# Patient Record
Sex: FEMALE | Race: WHITE | Employment: FULL TIME | URBAN - METROPOLITAN AREA
[De-identification: names, ages, dates, MRNs, and addresses within clinical notes are randomized per-mention and may not be internally consistent; named-entity substitution may affect disease eponyms.]

---

## 2023-06-26 LAB
ALBUMIN SERPL-MCNC: 4.2 G/DL
ALP BLD-CCNC: 44 U/L
ALT SERPL-CCNC: 25 U/L
ANION GAP SERPL CALCULATED.3IONS-SCNC: NORMAL MMOL/L
AST SERPL-CCNC: 31 U/L
BILIRUB SERPL-MCNC: NORMAL MG/DL
BUN BLDV-MCNC: NORMAL MG/DL
CALCIUM SERPL-MCNC: 9.7 MG/DL
CHLORIDE BLD-SCNC: 104 MMOL/L
CHOLESTEROL, TOTAL: 173 MG/DL
CHOLESTEROL/HDL RATIO: NORMAL
CO2: 24 MMOL/L
CREAT SERPL-MCNC: 0.81 MG/DL
EGFR: 85
GLUCOSE BLD-MCNC: 92 MG/DL
HDLC SERPL-MCNC: 55 MG/DL (ref 35–70)
LDL CHOLESTEROL CALCULATED: 105 MG/DL (ref 0–160)
NONHDLC SERPL-MCNC: 118 MG/DL
POTASSIUM SERPL-SCNC: 3.9 MMOL/L
SODIUM BLD-SCNC: 138 MMOL/L
TOTAL PROTEIN: 7.6
TRIGL SERPL-MCNC: 71 MG/DL
VLDLC SERPL CALC-MCNC: NORMAL MG/DL

## 2023-06-27 LAB
AVERAGE GLUCOSE: 114
HBA1C MFR BLD: 5.6 %

## 2023-08-22 ENCOUNTER — HOSPITAL ENCOUNTER (OUTPATIENT)
Dept: PHYSICAL THERAPY | Age: 58
Setting detail: THERAPIES SERIES
Discharge: HOME | End: 2023-08-22
Payer: COMMERCIAL

## 2023-08-22 PROCEDURE — 97110 THERAPEUTIC EXERCISES: CPT

## 2023-08-22 PROCEDURE — 97161 PT EVAL LOW COMPLEX 20 MIN: CPT

## 2023-08-22 NOTE — PLAN OF CARE
with minimal to no noted difficulty. 4). Pt able to ascend and descend stairs with minimal to no noted difficulty. 5). 5). Pt able to amb 120 feet with minimal to noted difficulty    6). 6). Pt to increase LEFI to 65/80     PLAN OF CARE     To see patient  2 x/week for  6 weeks for the following treatment interventions:    [x]   Therapeutic Exercise  []   Modalities of Choice:   []   Vasopneumatic pump    [] Lymphatouch     [x]   Manual Therapy  [x]   Neuromuscular Re-Education  [x]   Gait Training   []   Therapeutic Activity  [x]   Lymphedema precautions and prevention  [x]   Use of compression garment as appropriate  []   Other     Treatment Performed this visit :   20 minutes   Education on lymphedema versus lipedema and lymphatic system   Exercise: toe curls, ankle pumps, knee bends and hip ER/IR    Pt response to Tx:  Pt verbalized  understanding and demonstrated appropriate techniques with exercise. Plan for Next Visit:   MLD to bilateral LE   kinesiotaping    Timed Code Treatment Minutes:   20  minutes   Total Treatment Time:  40  minutes    Plan of care sent to provider:      [x]Faxed  []Co-signature    (attempts: 1[x] 2 []3[])         Medicare Cap total YTD:    [x]N/A  Workers Comp Time Stamp  [x]N/A   Time In:   Time Out:    Thank you for the referral of this patient.       Va Caraballo PT [facility]  License #[id number]

## 2023-08-22 NOTE — FLOWSHEET NOTE
reducing/eliminating soft tissue swelling/inflammation/restriction, improving soft tissue extensibility and allowing for proper ROM for normal function with self care, mobility, lifting and ambulation. []CRP:  Canalith Repositioning procedure for the assessment, treatment and education of BPPV    Modalities:       Charges:  Timed Code Treatment Minutes: 20   Total Treatment Minutes: 40     Medicare Cap total YTD:        [x]N/A  Workers Comp Time Stamp  (Per CPT and Total Treatment) [x]N/A   Time In:   Time Out:       [x] EVAL    [] Dry Needling  [x] OH(54077)   x  1   [] EStim Unattended 73547  [] NMR (70348)  x     [] Estim Attended  17487  [] Manual (29644)  x      [] Mechanical Txn 02419  [] TA    x     [] Ultrasound  [] Gait   x  [] Vaso  [] CRP    [] Ionto           [] Other:        Electronically signed by: BRANT Fournier,[id number]      Note: If patient does not return for scheduled/ recommended follow up visits, this note will serve as a discharge from care along with most recent update on progress.

## 2023-09-05 ENCOUNTER — HOSPITAL ENCOUNTER (OUTPATIENT)
Dept: PHYSICAL THERAPY | Age: 58
Setting detail: THERAPIES SERIES
Discharge: HOME | End: 2023-09-05
Payer: COMMERCIAL

## 2023-09-05 PROCEDURE — 97110 THERAPEUTIC EXERCISES: CPT

## 2023-09-05 PROCEDURE — 97140 MANUAL THERAPY 1/> REGIONS: CPT

## 2023-09-05 NOTE — FLOWSHEET NOTE
proximal hip and LS spine soft tissue/joints for the purpose of modulating pain, promoting relaxation,  increasing ROM, reducing/eliminating soft tissue swelling/inflammation/restriction, improving soft tissue extensibility and allowing for proper ROM for normal function with self care, mobility, lifting and ambulation. []CRP:  Canalith Repositioning procedure for the assessment, treatment and education of BPPV    Modalities:       Charges:  Timed Code Treatment Minutes: 90   Total Treatment Minutes: 90     Medicare Cap total YTD:        [x]N/A  Workers Comp Time Stamp  (Per CPT and Total Treatment) [x]N/A   Time In:   Time Out:       [] EVAL    [] Dry Needling  [x] DP(40924)   x  1   [] EStim Unattended 85150  [] NMR (22721)  x     [] Estim Attended  49776  [x] Manual (40644)  x 5     [] Mechanical Txn 71248  [] TA    x     [] Ultrasound  [] Gait   x  [] Vaso  [] CRP    [] Ionto           [] Other:        Electronically signed by: NICK Uribe,[id number]      Note: If patient does not return for scheduled/ recommended follow up visits, this note will serve as a discharge from care along with most recent update on progress.

## 2023-09-12 ENCOUNTER — HOSPITAL ENCOUNTER (OUTPATIENT)
Dept: PHYSICAL THERAPY | Age: 58
Setting detail: THERAPIES SERIES
Discharge: HOME | End: 2023-09-12
Payer: COMMERCIAL

## 2023-09-12 NOTE — FLOWSHEET NOTE
Physical Therapy  Cancellation/No-show Note  Patient Name:  Jc Bueno  :  1965   Date:  2023  Cancels to Date: 1  No-shows to Date: 0    For today's appointment patient:  [x]  Cancelled  []  Rescheduled appointment  []  No-show     Reason given by patient:  []  Patient ill  [x]  Conflicting appointment  []  No transportation    []  Conflict with work  []  No reason given  []  Other:     Comments:      Electronically signed by:   María Elena Castro, [address] [place] [address], [id number]

## 2023-09-14 ENCOUNTER — HOSPITAL ENCOUNTER (OUTPATIENT)
Dept: PHYSICAL THERAPY | Age: 58
Setting detail: THERAPIES SERIES
Discharge: HOME | End: 2023-09-14
Payer: COMMERCIAL

## 2023-09-14 PROCEDURE — 97140 MANUAL THERAPY 1/> REGIONS: CPT

## 2023-09-14 NOTE — FLOWSHEET NOTE
Physical Therapy Daily Treatment Note    [x]Daily Tx Note    []Progress Note    [] Discharge Summary         Date:  2023    Patient Name:  Yana Davis    :  1965  MRN: [id number]      Medical/Treatment Diagnosis Information:  Diagnosis: chronic lymphedema bilateral I89.0  Treatment Diagnosis: pain and abnormality of gait    Insurance/Certification information:  PT Insurance Information: [facility] no authorization required    Physician Information:  Referring Provider (secondary): Petrona Melo MD      Plan of care sent to provider:      [x]Faxed   []Co-signature    (attempts: 1[x] 23 2 []3[])     Plan of care signed :  [x]  Yes  [] No      Date of Patient follow up with Physician:     Is this a Progress Report:     []  Yes  [x]  No      If Yes:  Date Range for reporting period:  Beginning 2023  Ending    Progress report will be due (10 Rx or 30 days whichever is less):   NV    Recertification will be due (POC Duration  / 90 days whichever is less): 2023      Visit # Insurance Allowable Auth Required   3/12   []  Yes [x]  No        Functional Scale:  LEFI   Date    2023  Score   55/80    Latex Allergy:  [x]NO      []YES  Preferred Language for Healthcare:   [x]English       []other:    RESTRICTIONS/PRECAUTIONS:      SUBJECTIVE: Pt reports that she is doing better overall and went to surgical specialist for lipedema and will be planning surgery. Pt will have 5 surgeries 6 weeks apart. Pain level:  bilateral knee pain 3/10  Plan Moving Forward/ For next visit:   MLD to bilateral LE   kinesiotaping      OBJECTIVE: See eval  O2 sat 94%, pulse 70, /83 Beginning     Exercises/Interventions:     Exercises in bold performed in department today. Items not bolded are carried forward from prior visits for continuity of the record.   Exercise/Activity Sets/ Reps/ Resistance    Comments/ Details HEP     Scifit  5 minutes seat 18 arms 7     Therapeutic Ex        toe

## 2023-09-26 ENCOUNTER — HOSPITAL ENCOUNTER (OUTPATIENT)
Dept: PHYSICAL THERAPY | Age: 58
Setting detail: THERAPIES SERIES
Discharge: HOME | End: 2023-09-26
Payer: COMMERCIAL

## 2023-09-26 PROCEDURE — 97110 THERAPEUTIC EXERCISES: CPT

## 2023-09-26 PROCEDURE — 97140 MANUAL THERAPY 1/> REGIONS: CPT

## 2023-09-26 NOTE — PROGRESS NOTES
Physical Therapy Daily Treatment Note    [x]Daily Tx Note    [x]Progress Note    [] Discharge Summary         Date:  2023    Patient Name:  Angel Luis Mcdowell    :  1965  MRN: [id number]      Medical/Treatment Diagnosis Information:  Diagnosis: chronic lymphedema bilateral I89.0  Treatment Diagnosis: pain and abnormality of gait    Insurance/Certification information:  PT Insurance Information: [facility] no authorization required    Physician Information:  Referring Provider (secondary): Jimmy Saldaña MD      Plan of care sent to provider:      [x]Faxed   []Co-signature    (attempts: 1[x] 23 2 []3[])     Plan of care signed :  [x]  Yes  [] No      Date of Patient follow up with Physician:     Is this a Progress Report:     []  Yes  [x]  No      If Yes:  Date Range for reporting period:  Beginning 2023  Ending    Progress report will be due (10 Rx or 30 days whichever is less):     Recertification will be due (POC Duration  / 90 days whichever is less): 2023      Visit # Insurance Allowable Auth Required      []  Yes [x]  No        Functional Scale:  LEFI   Date    2023  Score   55/80    Latex Allergy:  [x]NO      []YES  Preferred Language for Healthcare:   [x]English       []other:    RESTRICTIONS/PRECAUTIONS:      SUBJECTIVE: Pt stated that massage and wrapping has helped but had noted irritation with removal of tape.       Pain level:  0/10 beginning and end of treatment    Plan Moving Forward/ For next visit:   MLD to bilateral LE   kinesiotaping      OBJECTIVE: See eval  O2 sat 96%, pulse 62, /84 Beginning   Lower Extremity R L R L   Date           10 Inches above knee distal patella 70.5 66.4 65.7 63.9   6 Inches above knee distal patella         59.5 56.8 56.2 56.9   Knee distal patella          43.5 40.7 43.2 41.5   5 Below knee distal patella 51.1 46.4 48.5 46.4   10 Below knee distal patella 35.5 35.4 34.9 35.4           Proximal

## 2023-09-28 ENCOUNTER — APPOINTMENT (OUTPATIENT)
Dept: PHYSICAL THERAPY | Age: 58
End: 2023-09-28
Payer: COMMERCIAL

## 2023-10-12 ENCOUNTER — APPOINTMENT (OUTPATIENT)
Dept: PHYSICAL THERAPY | Age: 58
End: 2023-10-12
Payer: COMMERCIAL

## 2023-10-17 ENCOUNTER — HOSPITAL ENCOUNTER (OUTPATIENT)
Dept: PHYSICAL THERAPY | Age: 58
Setting detail: THERAPIES SERIES
Discharge: HOME | End: 2023-10-17
Payer: COMMERCIAL

## 2023-10-17 PROCEDURE — 97110 THERAPEUTIC EXERCISES: CPT

## 2023-10-17 PROCEDURE — 97140 MANUAL THERAPY 1/> REGIONS: CPT

## 2023-10-17 NOTE — PROGRESS NOTES
Physical Therapy Daily Treatment Note    [x]Daily Tx Note    []Progress Note    [] Discharge Summary         Date:  10/17/2023    Patient Name:  Betsey Pagan    :  1965  MRN: [id number]      Medical/Treatment Diagnosis Information:  Diagnosis: chronic lymphedema bilateral I89.0  Treatment Diagnosis: pain and abnormality of gait    Insurance/Certification information:  PT Insurance Information: [facility] no authorization required    Physician Information:  Referring Provider (secondary): Dmitry Angelo MD      Plan of care sent to provider:      [x]Faxed   []Co-signature    (attempts: 1[x] 23 2 []3[])     Plan of care signed :  [x]  Yes  [] No      Date of Patient follow up with Physician:     Is this a Progress Report:     []  Yes  [x]  No      If Yes:  Date Range for reporting period:  Beginning 2023  Ending    Progress report will be due (10 Rx or 30 days whichever is less):    NV  Recertification will be due (POC Duration  / 90 days whichever is less): 2023      Visit # Insurance Allowable Auth Required      []  Yes [x]  No        Functional Scale:  LEFI   Date    2023  Score   55/80    Latex Allergy:  [x]NO      []YES  Preferred Language for Healthcare:   [x]English       []other:    RESTRICTIONS/PRECAUTIONS:      SUBJECTIVE: Pt stated that she has been sore in bilateral hips secondary to walking 15,000 steps per day when on vacation. Pt reports that pain was 8/10. Pain level:  3/10 beginning and 1-2/10 end of treatment    Plan Moving Forward/ For next visit:   MLD to bilateral LE   kinesiotaping      OBJECTIVE: See eval  O2 sat 92-96% required diaphragmatic breathing, pulse 82, /87 Beginning     Exercises/Interventions:     Exercises in bold performed in department today. Items not bolded are carried forward from prior visits for continuity of the record.   Exercise/Activity Sets/ Reps/ Resistance    Comments/ Details HEP     Scifit  5 minutes seat 18

## 2023-10-19 ENCOUNTER — HOSPITAL ENCOUNTER (OUTPATIENT)
Dept: PHYSICAL THERAPY | Age: 58
Setting detail: THERAPIES SERIES
Discharge: HOME | End: 2023-10-19
Payer: COMMERCIAL

## 2023-10-19 PROCEDURE — 97140 MANUAL THERAPY 1/> REGIONS: CPT

## 2023-10-19 PROCEDURE — 97110 THERAPEUTIC EXERCISES: CPT

## 2023-10-19 NOTE — PROGRESS NOTES
Physical Therapy Daily Treatment Note    []Daily Tx Note    [x]Progress Note    [] Discharge Summary         Date:  10/19/2023    Patient Name:  John Brown    :  1965  MRN: [id number]      Medical/Treatment Diagnosis Information:  Diagnosis: chronic lymphedema bilateral I89.0  Treatment Diagnosis: pain and abnormality of gait    Insurance/Certification information:  PT Insurance Information: [facility] no authorization required    Physician Information:  Referring Provider (secondary): Miladys Jimenez MD      Plan of care sent to provider:      [x]Faxed   []Co-signature    (attempts: 1[x] 23 2 []3[])     Plan of care signed :  [x]  Yes  [] No      Date of Patient follow up with Physician:     Is this a Progress Report:     []  Yes  [x]  No      If Yes:  Date Range for reporting period:  Beginning 2023  Ending 10/19/2023    Progress report will be due (10 Rx or 30 days whichever is less):   1553  Recertification will be due (POC Duration  / 90 days whichever is less): 2023      Visit # Insurance Allowable Auth Required      []  Yes [x]  No        Functional Scale:  LEFI   Date    10/19/2023  Score   61/80    Latex Allergy:  [x]NO      []YES  Preferred Language for Healthcare:   [x]English       []other:    RESTRICTIONS/PRECAUTIONS:      SUBJECTIVE: Pt stated that she has less increased intensity moments in the hips. Pt did not bring wraps today.       Pain level:  2/10 beginning at bilateral hips     Plan Moving Forward/ For next visit:   MLD to bilateral LE   Wrapping   Exercise      OBJECTIVE: See eval  O2 sat 95% required diaphragmatic breathing, pulse 82, /86 Beginning     Lower Extremity R L R L R L   Date 8/22 8/22 9/26 9/26 10/19 10/19          10 Inches above knee distal patella 70.5 66.4 65.7 63.9 66.3 64.9   6 Inches above knee distal patella         59.5 56.8 56.2 56.9 58.8 56.4   Knee distal patella          43.5 40.7 43.2 41.5 42.6 40.2   5 Below knee distal

## 2023-10-24 ENCOUNTER — HOSPITAL ENCOUNTER (OUTPATIENT)
Dept: PHYSICAL THERAPY | Age: 58
Setting detail: THERAPIES SERIES
Discharge: HOME | End: 2023-10-24
Payer: COMMERCIAL

## 2023-10-24 PROCEDURE — 97140 MANUAL THERAPY 1/> REGIONS: CPT

## 2023-10-26 ENCOUNTER — HOSPITAL ENCOUNTER (OUTPATIENT)
Dept: PHYSICAL THERAPY | Age: 58
Setting detail: THERAPIES SERIES
Discharge: HOME | End: 2023-10-26
Payer: COMMERCIAL

## 2023-10-26 PROCEDURE — 97110 THERAPEUTIC EXERCISES: CPT

## 2023-10-26 PROCEDURE — 97140 MANUAL THERAPY 1/> REGIONS: CPT

## 2023-10-26 NOTE — FLOWSHEET NOTE
Poor    Patient requires continued skilled intervention: [x] Yes  [] No    Treatment/Activity Tolerance:  [x] Patient able to complete treatment  [] Patient limited by fatigue  [] Patient limited by pain    [] Patient limited by other medical complications  [] Other:       PLAN: See eval  [x] Continue per plan of care [] Alter current plan (see comments above)  [] Plan of care initiated [] Hold pending MD visit [] Discharge    Therapeutic Exercise and NMR EXR  [x] (62857) Provided verbal/tactile cueing for activities related to strengthening, flexibility, endurance, ROM  for improvements in proximal strength and core control with self care, mobility, lifting and ambulation.  [] (39932) Provided verbal/tactile cueing for activities related to improving balance, coordination, kinesthetic sense, posture, motor skill, proprioception  to assist with core control in self care, mobility, lifting, and ambulation.      Therapeutic Activities and Gait:    [] (10017 or 90583) Provided verbal/tactile cueing for activities related to improving balance, coordination, kinesthetic sense, posture, motor skill, proprioception and motor activation to allow for proper function  with self care and ADLs  [] (77509) Provided training and instruction to the patient for proper core and proximal hip recruitment and positioning with ambulation re-education     Home Exercise Program:    [x] (00216) Reviewed/Progressed HEP activities related to strengthening, flexibility, endurance, ROM of core, proximal hip and LE for functional self-care, mobility, lifting and ambulation   [] (70003) Reviewed/Progressed HEP activities related to improving balance, coordination, kinesthetic sense, posture, motor skill, proprioception of core, proximal hip and LE for self care, mobility, lifting, and ambulation      Manual Treatments:  PROM / STM / Oscillations-Mobs:  G-I, II, III, IV (PA's, Inf., Post.)  [x] (23666) Provided manual therapy to mobilize proximal

## 2023-10-31 ENCOUNTER — HOSPITAL ENCOUNTER (OUTPATIENT)
Dept: PHYSICAL THERAPY | Age: 58
Setting detail: THERAPIES SERIES
Discharge: HOME | End: 2023-10-31
Payer: COMMERCIAL

## 2023-11-02 ENCOUNTER — HOSPITAL ENCOUNTER (OUTPATIENT)
Dept: PHYSICAL THERAPY | Age: 58
Setting detail: THERAPIES SERIES
Discharge: HOME | End: 2023-11-02
Payer: COMMERCIAL

## 2023-11-02 PROCEDURE — 97140 MANUAL THERAPY 1/> REGIONS: CPT

## 2023-11-02 PROCEDURE — 97110 THERAPEUTIC EXERCISES: CPT

## 2023-11-02 NOTE — FLOWSHEET NOTE
motor skill, proprioception of core, proximal hip and LE for self care, mobility, lifting, and ambulation      Manual Treatments:  PROM / STM / Oscillations-Mobs:  G-I, II, III, IV (PA's, Inf., Post.)  [x] (74296) Provided manual therapy to mobilize proximal hip and LS spine soft tissue/joints for the purpose of modulating pain, promoting relaxation,  increasing ROM, reducing/eliminating soft tissue swelling/inflammation/restriction, improving soft tissue extensibility and allowing for proper ROM for normal function with self care, mobility, lifting and ambulation. []CRP:  Canalith Repositioning procedure for the assessment, treatment and education of BPPV    Modalities:       Charges:  Timed Code Treatment Minutes: 83   Total Treatment Minutes: 83     Medicare Cap total YTD:        [x]N/A  Workers Comp Time Stamp  (Per CPT and Total Treatment) [x]N/A   Time In:   Time Out:       [] EVAL    [] Dry Needling  [x] SO(40327)   x 1    [] EStim Unattended 46688  [] NMR (03253)  x     [] Estim Attended  03594  [x] Manual (34805)  x 5     [] Mechanical Txn 44742  [] TA    x     [] Ultrasound  [] Gait   x  [] Vaso  [] CRP    [] Ionto           [] Other:        Electronically signed by: KIMBERLY Garvey,[id number]      Note: If patient does not return for scheduled/ recommended follow up visits, this note will serve as a discharge from care along with most recent update on progress.

## 2023-11-03 ENCOUNTER — APPOINTMENT (OUTPATIENT)
Dept: PHYSICAL THERAPY | Age: 58
End: 2023-11-03
Payer: COMMERCIAL

## 2023-11-07 ENCOUNTER — HOSPITAL ENCOUNTER (OUTPATIENT)
Dept: PHYSICAL THERAPY | Age: 58
Setting detail: THERAPIES SERIES
Discharge: HOME | End: 2023-11-07
Payer: COMMERCIAL

## 2023-11-07 PROCEDURE — 97140 MANUAL THERAPY 1/> REGIONS: CPT

## 2023-11-07 PROCEDURE — 97110 THERAPEUTIC EXERCISES: CPT

## 2023-11-07 NOTE — FLOWSHEET NOTE
goals listed. [] Progression is slowed due to complexities/Impairments listed. [] Progression has been slowed due to co-morbidities. [] Plan just implemented, too soon to assess goals progression <30days   [] Goals require adjustment due to lack of progress  [] Patient is not progressing as expected and requires additional follow up with physician  [] Other    Prognosis for POC: [x] Good [] Fair  [] Poor    Patient requires continued skilled intervention: [x] Yes  [] No    Treatment/Activity Tolerance:  [x] Patient able to complete treatment  [] Patient limited by fatigue  [] Patient limited by pain    [] Patient limited by other medical complications  [] Other:       PLAN: See eval  [x] Continue per plan of care [] Alter current plan (see comments above)  [] Plan of care initiated [] Hold pending MD visit [] Discharge    Therapeutic Exercise and NMR EXR  [x] (62134) Provided verbal/tactile cueing for activities related to strengthening, flexibility, endurance, ROM  for improvements in proximal strength and core control with self care, mobility, lifting and ambulation.  [] (05983) Provided verbal/tactile cueing for activities related to improving balance, coordination, kinesthetic sense, posture, motor skill, proprioception  to assist with core control in self care, mobility, lifting, and ambulation.      Therapeutic Activities and Gait:    [] (41470 or 78422) Provided verbal/tactile cueing for activities related to improving balance, coordination, kinesthetic sense, posture, motor skill, proprioception and motor activation to allow for proper function  with self care and ADLs  [] (99306) Provided training and instruction to the patient for proper core and proximal hip recruitment and positioning with ambulation re-education     Home Exercise Program:    [x] (96065) Reviewed/Progressed HEP activities related to strengthening, flexibility, endurance, ROM of core, proximal hip and LE for functional self-care,

## 2023-11-09 ENCOUNTER — HOSPITAL ENCOUNTER (OUTPATIENT)
Dept: PHYSICAL THERAPY | Age: 58
Setting detail: THERAPIES SERIES
Discharge: HOME | End: 2023-11-09
Payer: COMMERCIAL

## 2023-11-09 PROCEDURE — 97110 THERAPEUTIC EXERCISES: CPT

## 2023-11-09 PROCEDURE — 97140 MANUAL THERAPY 1/> REGIONS: CPT

## 2023-11-09 NOTE — FLOWSHEET NOTE
Physical Therapy Daily Treatment Note    []Daily Tx Note    []Progress Note    [] Discharge Summary         Date:  2023    Patient Name:  Troy Parmar    :  1965  MRN: [id number]      Medical/Treatment Diagnosis Information:  Diagnosis: chronic lymphedema bilateral I89.0  Treatment Diagnosis: pain and abnormality of gait    Insurance/Certification information:  PT Insurance Information: [facility] no authorization required; [place]    Physician Information:  Referring Provider (secondary): Kassandra Crain MD      Plan of care sent to provider:      [x]Faxed   []Co-signature    (attempts: 1[x] 23 2 []3[])     Plan of care signed :  [x]  Yes  [] No      Date of Patient follow up with Physician:     Is this a Progress Report:     []  Yes  [x]  No      If Yes:  Date Range for reporting period:  Beginning 2023  Ending 10/19/2023    Progress report will be due (10 Rx or 30 days whichever is less):    NV  Recertification will be due (POC Duration  / 90 days whichever is less): 2023      Visit # Insurance Allowable Auth Required      []  Yes [x]  No        Functional Scale:  LEFI   Date    10/19/2023  Score   61/80    Latex Allergy:  [x]NO      []YES  Preferred Language for Healthcare:   [x]English       []other:    RESTRICTIONS/PRECAUTIONS:      SUBJECTIVE: Pt reports that she is doing ok today. Pain level:  0.5-1/10 beginning at bilateral hips    Plan Moving Forward/ For next visit:   MLD to bilateral LE   Wrapping   Exercise      OBJECTIVE:   O2 sat 95%, pulse 88, /79 Beginning manual      Exercises/Interventions:     Exercises in bold performed in department today. Items not bolded are carried forward from prior visits for continuity of the record.   Exercise/Activity Sets/ Reps/ Resistance    Comments/ Details HEP     Scifit  5 minutes seat 18 arms 7 subjective    Therapeutic Ex        toe curls, ankle pumps, knee bends and hip ER/IR  1x10       Quad sets and glut

## 2023-11-14 ENCOUNTER — HOSPITAL ENCOUNTER (OUTPATIENT)
Dept: PHYSICAL THERAPY | Age: 58
Setting detail: THERAPIES SERIES
Discharge: HOME | End: 2023-11-14
Payer: COMMERCIAL

## 2023-11-14 NOTE — FLOWSHEET NOTE
Physical Therapy  Cancellation/No-show Note  Patient Name:  Misty Sheth  :  1965   Date:  2023  Cancels to Date: 3  No-shows to Date: 0    For today's appointment patient:  [x]  Cancelled  []  Rescheduled appointment  []  No-show     Reason given by patient:  []  Patient ill  []  Conflicting appointment  []  No transportation    [x]  Conflict with work  []  No reason given  []  Other:     Comments:      Electronically signed by:   Danielle Allred, [id number]

## 2023-11-15 ENCOUNTER — TELEPHONE (OUTPATIENT)
Dept: FAMILY MEDICINE CLINIC | Age: 58
End: 2023-11-15

## 2023-11-15 NOTE — TELEPHONE ENCOUNTER
----- Message from [facility] sent at 11/15/2023  9:14 AM EST -----  Subject: Appointment Request    Reason for Call: New Patient/New to Provider Appointment needed: New   Patient Request Appointment    QUESTIONS    Reason for appointment request? Requested Provider unavailable - Allison Padilla PA-C     Additional Information for Provider? New patient would like to get   established care with the provider requested and if she isn't available,   the pt would like to establish care with anyone else in the practice. No   appointments were available please give pt a call back to assist further.    Patient stated she is pretty flexible for scheduling and would prefer a   morning appt on any day.   ---------------------------------------------------------------------------  --------------  August [facility]  [phone]; OK to leave message on voicemail  ---------------------------------------------------------------------------  --------------  SCRIPT ANSWERS

## 2023-11-16 ENCOUNTER — APPOINTMENT (OUTPATIENT)
Dept: PHYSICAL THERAPY | Age: 58
End: 2023-11-16
Payer: COMMERCIAL

## 2023-11-16 ENCOUNTER — HOSPITAL ENCOUNTER (OUTPATIENT)
Dept: PHYSICAL THERAPY | Age: 58
Setting detail: THERAPIES SERIES
Discharge: HOME | End: 2023-11-16
Payer: COMMERCIAL

## 2023-11-16 PROCEDURE — 97140 MANUAL THERAPY 1/> REGIONS: CPT

## 2023-11-16 NOTE — FLOWSHEET NOTE
Dr. Alice Aase,   Pt has been seen for a total of 12 visits recommend to continue 2 times per week for 4 more weeks to progress exercise and perform manual treatment. Pt continues to show decrease in overall girth measurements. Recommendations:_________________________________________________________________      Signature:_________________________________________________________________________    Physical Therapy Daily Treatment Note    []Daily Tx Note    []Progress Note    [] Discharge Summary         Date:  2023    Patient Name:  Cem Josue    :  1965  MRN: [id number]      Medical/Treatment Diagnosis Information:  Diagnosis: chronic lymphedema bilateral I89.0  Treatment Diagnosis: pain and abnormality of gait    Insurance/Certification information:  PT Insurance Information: [facility] no authorization required; [place]    Physician Information:  Referring Provider (secondary): Modesta Dean MD      Plan of care sent to provider:      [x]Faxed   []Co-signature    (attempts: 1[x] 23 2 []3[])     Plan of care signed :  [x]  Yes  [] No      Date of Patient follow up with Physician:     Is this a Progress Report:     [x]  Yes  []  No      If Yes:  Date Range for reporting period:  Beginning 10/19/2023  Ending 2023    Progress report will be due (10 Rx or 30 days whichever is less):     Recertification will be due (POC Duration  / 90 days whichever is less): 2023      Visit # Insurance Allowable Auth Required      []  Yes [x]  No        Functional Scale:  LEFI   Date    10/19/2023  Score   61/80    Latex Allergy:  [x]NO      []YES  Preferred Language for Healthcare:   [x]English       []other:    RESTRICTIONS/PRECAUTIONS:      SUBJECTIVE: Pt reports that she is did not sleep well secondary to the cat.       Pain level: 2/10 beginning all overall body; end 1/10    Plan Moving Forward/ For next visit:   MLD to bilateral LE   Wrapping   Exercise      OBJECTIVE:   O2 sat 94-97%

## 2023-11-20 SDOH — HEALTH STABILITY: PHYSICAL HEALTH: ON AVERAGE, HOW MANY MINUTES DO YOU ENGAGE IN EXERCISE AT THIS LEVEL?: 30 MIN

## 2023-11-20 SDOH — HEALTH STABILITY: PHYSICAL HEALTH: ON AVERAGE, HOW MANY DAYS PER WEEK DO YOU ENGAGE IN MODERATE TO STRENUOUS EXERCISE (LIKE A BRISK WALK)?: 4 DAYS

## 2023-11-21 ENCOUNTER — HOSPITAL ENCOUNTER (OUTPATIENT)
Dept: PHYSICAL THERAPY | Age: 58
Setting detail: THERAPIES SERIES
Discharge: HOME | End: 2023-11-21
Payer: COMMERCIAL

## 2023-11-21 ENCOUNTER — OFFICE VISIT (OUTPATIENT)
Dept: FAMILY MEDICINE CLINIC | Age: 58
End: 2023-11-21
Payer: COMMERCIAL

## 2023-11-21 VITALS
BODY MASS INDEX: 41.42 KG/M2 | HEIGHT: 64 IN | OXYGEN SATURATION: 97 % | WEIGHT: 242.6 LBS | HEART RATE: 77 BPM | DIASTOLIC BLOOD PRESSURE: 84 MMHG | SYSTOLIC BLOOD PRESSURE: 112 MMHG | RESPIRATION RATE: 16 BRPM

## 2023-11-21 DIAGNOSIS — E66.01 CLASS 3 SEVERE OBESITY WITHOUT SERIOUS COMORBIDITY WITH BODY MASS INDEX (BMI) OF 40.0 TO 44.9 IN ADULT, UNSPECIFIED OBESITY TYPE (HCC): Primary | ICD-10-CM

## 2023-11-21 DIAGNOSIS — Z12.11 COLON CANCER SCREENING: ICD-10-CM

## 2023-11-21 PROBLEM — R60.9 LIPEDEMA: Status: ACTIVE | Noted: 2023-11-21

## 2023-11-21 PROCEDURE — 99203 OFFICE O/P NEW LOW 30 MIN: CPT | Performed by: PHYSICIAN ASSISTANT

## 2023-11-21 PROCEDURE — 3017F COLORECTAL CA SCREEN DOC REV: CPT | Performed by: PHYSICIAN ASSISTANT

## 2023-11-21 PROCEDURE — G8427 DOCREV CUR MEDS BY ELIG CLIN: HCPCS | Performed by: PHYSICIAN ASSISTANT

## 2023-11-21 PROCEDURE — G8484 FLU IMMUNIZE NO ADMIN: HCPCS | Performed by: PHYSICIAN ASSISTANT

## 2023-11-21 PROCEDURE — 97140 MANUAL THERAPY 1/> REGIONS: CPT

## 2023-11-21 PROCEDURE — 97110 THERAPEUTIC EXERCISES: CPT

## 2023-11-21 PROCEDURE — 1036F TOBACCO NON-USER: CPT | Performed by: PHYSICIAN ASSISTANT

## 2023-11-21 PROCEDURE — G8417 CALC BMI ABV UP PARAM F/U: HCPCS | Performed by: PHYSICIAN ASSISTANT

## 2023-11-21 RX ORDER — CALCIUM CARBONATE/VITAMIN D3 600MG-62.5
1 CAPSULE ORAL DAILY
COMMUNITY

## 2023-11-21 RX ORDER — BIOTIN 5 MG
TABLET ORAL
COMMUNITY

## 2023-11-21 SDOH — ECONOMIC STABILITY: HOUSING INSECURITY
IN THE LAST 12 MONTHS, WAS THERE A TIME WHEN YOU DID NOT HAVE A STEADY PLACE TO SLEEP OR SLEPT IN A SHELTER (INCLUDING NOW)?: NO

## 2023-11-21 SDOH — ECONOMIC STABILITY: FOOD INSECURITY: WITHIN THE PAST 12 MONTHS, THE FOOD YOU BOUGHT JUST DIDN'T LAST AND YOU DIDN'T HAVE MONEY TO GET MORE.: NEVER TRUE

## 2023-11-21 SDOH — ECONOMIC STABILITY: FOOD INSECURITY: WITHIN THE PAST 12 MONTHS, YOU WORRIED THAT YOUR FOOD WOULD RUN OUT BEFORE YOU GOT MONEY TO BUY MORE.: NEVER TRUE

## 2023-11-21 SDOH — ECONOMIC STABILITY: INCOME INSECURITY: HOW HARD IS IT FOR YOU TO PAY FOR THE VERY BASICS LIKE FOOD, HOUSING, MEDICAL CARE, AND HEATING?: NOT HARD AT ALL

## 2023-11-21 ASSESSMENT — PATIENT HEALTH QUESTIONNAIRE - PHQ9
4. FEELING TIRED OR HAVING LITTLE ENERGY: 0
8. MOVING OR SPEAKING SO SLOWLY THAT OTHER PEOPLE COULD HAVE NOTICED. OR THE OPPOSITE, BEING SO FIGETY OR RESTLESS THAT YOU HAVE BEEN MOVING AROUND A LOT MORE THAN USUAL: 1
2. FEELING DOWN, DEPRESSED OR HOPELESS: 0
SUM OF ALL RESPONSES TO PHQ QUESTIONS 1-9: 1
SUM OF ALL RESPONSES TO PHQ QUESTIONS 1-9: 1
3. TROUBLE FALLING OR STAYING ASLEEP: 0
10. IF YOU CHECKED OFF ANY PROBLEMS, HOW DIFFICULT HAVE THESE PROBLEMS MADE IT FOR YOU TO DO YOUR WORK, TAKE CARE OF THINGS AT HOME, OR GET ALONG WITH OTHER PEOPLE: 0
SUM OF ALL RESPONSES TO PHQ QUESTIONS 1-9: 1
6. FEELING BAD ABOUT YOURSELF - OR THAT YOU ARE A FAILURE OR HAVE LET YOURSELF OR YOUR FAMILY DOWN: 0
9. THOUGHTS THAT YOU WOULD BE BETTER OFF DEAD, OR OF HURTING YOURSELF: 0
5. POOR APPETITE OR OVEREATING: 0
7. TROUBLE CONCENTRATING ON THINGS, SUCH AS READING THE NEWSPAPER OR WATCHING TELEVISION: 0
1. LITTLE INTEREST OR PLEASURE IN DOING THINGS: 0
SUM OF ALL RESPONSES TO PHQ9 QUESTIONS 1 & 2: 0
SUM OF ALL RESPONSES TO PHQ QUESTIONS 1-9: 1

## 2023-11-21 ASSESSMENT — ENCOUNTER SYMPTOMS
GASTROINTESTINAL NEGATIVE: 1
RESPIRATORY NEGATIVE: 1

## 2023-11-21 NOTE — PROGRESS NOTES
Subjective:      Patient ID: Yana Davis is a 63 y.o. female. HPI    Patient is here today to establish care. She is overall feeling well. She has a few concerns today mostly being her weight. She has struggled with her weight for years. 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 Has been diagnosed with lipedema and is being treated for that. She is interested in starting on mounjaro once it is available for weight loss. Last labs were in June and were normal. She is past due on health maintenance. I have reviewed the patient's medical history in detail and updated the computerized patient record. Review of Systems   Constitutional: Negative. Respiratory: Negative. Cardiovascular: Negative. Gastrointestinal: Negative. Genitourinary: Negative. Objective:   Physical Exam  Constitutional:       Appearance: Normal appearance. She is obese. Cardiovascular:      Rate and Rhythm: Normal rate and regular rhythm. Heart sounds: Normal heart sounds. Pulmonary:      Effort: Pulmonary effort is normal.      Breath sounds: Normal breath sounds. Neurological:      General: No focal deficit present. Mental Status: She is alert and oriented to person, place, and time. Assessment / Plan:          Diagnosis Orders   1. Class 3 severe obesity without serious comorbidity with body mass index (BMI) of 40.0 to 44.9 in adult, unspecified obesity type [facility])  Discussed the possibility of using zepbound once available to the public if a covered option. At this point mounjaro would not be covered. 2. Colon cancer screening  Fecal DNA Colorectal cancer screening (Cologuard)      Info on mammogram given.

## 2023-11-21 NOTE — FLOWSHEET NOTE
Physical Therapy Daily Treatment Note    []Daily Tx Note    []Progress Note    [] Discharge Summary         Date:  2023    Patient Name:  Suzanne Mckeon    :  1965  MRN: [id number]      Medical/Treatment Diagnosis Information:  Diagnosis: chronic lymphedema bilateral I89.0  Treatment Diagnosis: pain and abnormality of gait    Insurance/Certification information:  PT Insurance Information: [facility] no authorization required; [place]    Physician Information:  Referring Provider (secondary): Lo Nash MD      Plan of care sent to provider:      [x]Faxed   []Co-signature    (attempts: 1[x] 23 2 []3[])     Plan of care signed :  [x]  Yes  [] No      Date of Patient follow up with Physician:     Is this a Progress Report:     [x]  Yes  [x]  No      If Yes:  Date Range for reporting period:  Beginning 10/19/2023  Ending 2023    Progress report will be due (10 Rx or 30 days whichever is less):     Recertification will be due (POC Duration  / 90 days whichever is less): 2023      Visit # Insurance Allowable Auth Required      []  Yes [x]  No        Functional Scale:  LEFI   Date    10/19/2023  Score   61/80    Latex Allergy:  [x]NO      []YES  Preferred Language for Healthcare:   [x]English       []other:    RESTRICTIONS/PRECAUTIONS:      SUBJECTIVE: Pt reports that she feels that jeans are fitting a little different. Pain level: 2/10 beginning left hip    Plan Moving Forward/ For next visit:   MLD to bilateral LE   Wrapping   Exercise      OBJECTIVE:        Exercises/Interventions:     Exercises in bold performed in department today. Items not bolded are carried forward from prior visits for continuity of the record.   Exercise/Activity Sets/ Reps/ Resistance    Comments/ Details HEP     Scifit  5 minutes seat 18 arms 7 subjective    Therapeutic Ex        toe curls, ankle pumps, knee bends and hip ER/IR  1x10       Quad sets and glut sets  1x10 hold 5 secs       Hip ext,

## 2023-11-22 ENCOUNTER — PATIENT MESSAGE (OUTPATIENT)
Dept: FAMILY MEDICINE CLINIC | Age: 58
End: 2023-11-22

## 2023-11-28 ENCOUNTER — APPOINTMENT (OUTPATIENT)
Dept: PHYSICAL THERAPY | Age: 58
End: 2023-11-28
Payer: COMMERCIAL

## 2023-11-30 ENCOUNTER — HOSPITAL ENCOUNTER (OUTPATIENT)
Dept: PHYSICAL THERAPY | Age: 58
Setting detail: THERAPIES SERIES
Discharge: HOME | End: 2023-11-30
Payer: COMMERCIAL

## 2023-11-30 NOTE — FLOWSHEET NOTE
Physical Therapy  Cancellation/No-show Note  Patient Name:  Prabhjot Rawls  :  1965   Date:  2023  Cancels to Date: 4  No-shows to Date: 0    For today's appointment patient:  [x]  Cancelled  []  Rescheduled appointment  []  No-show     Reason given by patient:  []  Patient ill  []  Conflicting appointment  []  No transportation    [x]  Conflict with work  []  No reason given  []  Other:     Comments:      Electronically signed by:   Darrick Acosta, [address] [place] [address], [id number]

## 2023-12-05 ENCOUNTER — HOSPITAL ENCOUNTER (OUTPATIENT)
Dept: PHYSICAL THERAPY | Age: 58
Setting detail: THERAPIES SERIES
Discharge: HOME | End: 2023-12-05
Payer: COMMERCIAL

## 2023-12-05 PROCEDURE — 97140 MANUAL THERAPY 1/> REGIONS: CPT

## 2023-12-05 PROCEDURE — 97110 THERAPEUTIC EXERCISES: CPT

## 2023-12-05 NOTE — FLOWSHEET NOTE
Physical Therapy Daily Treatment Note    []Daily Tx Note    []Progress Note    [] Discharge Summary         Date:  2023    Patient Name:  Allyson Gutierrez    :  1965  MRN: [id number]      Medical/Treatment Diagnosis Information:  Diagnosis: chronic lymphedema bilateral I89.0  Treatment Diagnosis: pain and abnormality of gait    Insurance/Certification information:  PT Insurance Information: [facility] no authorization required; [place]    Physician Information:  Referring Provider (secondary): Betsy Lazar MD      Plan of care sent to provider:      [x]Faxed   []Co-signature    (attempts: 1[x] 23 2 []3[])     Plan of care signed :  [x]  Yes  [] No      Date of Patient follow up with Physician:     Is this a Progress Report:     [x]  Yes  [x]  No      If Yes:  Date Range for reporting period:  Beginning 10/19/2023  Ending 2023    Progress report will be due (10 Rx or 30 days whichever is less):     Recertification will be due (POC Duration  / 90 days whichever is less): 2023      Visit # Insurance Allowable Auth Required      []  Yes [x]  No        Functional Scale:  LEFI   Date    10/19/2023  Score   61/80    Latex Allergy:  [x]NO      []YES  Preferred Language for Healthcare:   [x]English       []other:    RESTRICTIONS/PRECAUTIONS:      SUBJECTIVE: Pt reports that she feels that jeans are fitting a little different. Pain level: 1-2/10 beginning left hip    Plan Moving Forward/ For next visit:   MLD to bilateral LE   Wrapping   Exercise      OBJECTIVE:   Pulse 67, O2 sat 95%, /86     Exercises/Interventions:     Exercises in bold performed in department today. Items not bolded are carried forward from prior visits for continuity of the record.   Exercise/Activity Sets/ Reps/ Resistance    Comments/ Details HEP     Scifit  5 minutes seat 18 arms 7 subjective    Therapeutic Ex        toe curls, ankle pumps, knee bends and hip ER/IR  1x10       Quad sets and glut sets

## 2023-12-07 ENCOUNTER — HOSPITAL ENCOUNTER (OUTPATIENT)
Dept: PHYSICAL THERAPY | Age: 58
Setting detail: THERAPIES SERIES
Discharge: HOME | End: 2023-12-07
Payer: COMMERCIAL

## 2023-12-07 PROCEDURE — 97110 THERAPEUTIC EXERCISES: CPT

## 2023-12-07 PROCEDURE — 97140 MANUAL THERAPY 1/> REGIONS: CPT

## 2023-12-07 NOTE — FLOWSHEET NOTE
physician  [] Other    Prognosis for POC: [x] Good [] Fair  [] Poor    Patient requires continued skilled intervention: [x] Yes  [] No    Treatment/Activity Tolerance:  [x] Patient able to complete treatment  [] Patient limited by fatigue  [] Patient limited by pain    [] Patient limited by other medical complications  [] Other:       PLAN: See eval  [x] Continue per plan of care [] Alter current plan (see comments above)  [] Plan of care initiated [] Hold pending MD visit [] Discharge    Therapeutic Exercise and NMR EXR  [x] (16444) Provided verbal/tactile cueing for activities related to strengthening, flexibility, endurance, ROM  for improvements in proximal strength and core control with self care, mobility, lifting and ambulation.  [] (36640) Provided verbal/tactile cueing for activities related to improving balance, coordination, kinesthetic sense, posture, motor skill, proprioception  to assist with core control in self care, mobility, lifting, and ambulation.      Therapeutic Activities and Gait:    [] (25522 or 75689) Provided verbal/tactile cueing for activities related to improving balance, coordination, kinesthetic sense, posture, motor skill, proprioception and motor activation to allow for proper function  with self care and ADLs  [] (35128) Provided training and instruction to the patient for proper core and proximal hip recruitment and positioning with ambulation re-education     Home Exercise Program:    [x] (85545) Reviewed/Progressed HEP activities related to strengthening, flexibility, endurance, ROM of core, proximal hip and LE for functional self-care, mobility, lifting and ambulation   [] (82480) Reviewed/Progressed HEP activities related to improving balance, coordination, kinesthetic sense, posture, motor skill, proprioception of core, proximal hip and LE for self care, mobility, lifting, and ambulation      Manual Treatments:  PROM / STM / Oscillations-Mobs:  G-I, II, III, IV (PA's, Inf.,

## 2023-12-12 ENCOUNTER — HOSPITAL ENCOUNTER (OUTPATIENT)
Dept: PHYSICAL THERAPY | Age: 58
Setting detail: THERAPIES SERIES
Discharge: HOME | End: 2023-12-12
Payer: COMMERCIAL

## 2023-12-12 PROCEDURE — 97140 MANUAL THERAPY 1/> REGIONS: CPT

## 2023-12-12 PROCEDURE — 97110 THERAPEUTIC EXERCISES: CPT

## 2023-12-12 NOTE — FLOWSHEET NOTE
Physical Therapy Daily Treatment Note    []Daily Tx Note    []Progress Note    [] Discharge Summary         Date:  2023    Patient Name:  Troy Parmar    :  1965  MRN: [id number]      Medical/Treatment Diagnosis Information:  Diagnosis: chronic lymphedema bilateral I89.0  Treatment Diagnosis: pain and abnormality of gait    Insurance/Certification information:  PT Insurance Information: [place] authorization required; [place]    Physician Information:  Referring Provider (secondary): Kassandra Crain MD      Plan of care sent to provider:      [x]Faxed   []Co-signature    (attempts: 1[x] 23 2 []3[])     Plan of care signed :  [x]  Yes  [] No      Date of Patient follow up with Physician:     Is this a Progress Report:     [x]  Yes  [x]  No      If Yes:  Date Range for reporting period:  Beginning 10/19/2023  Ending 2023    Progress report will be due (10 Rx or 30 days whichever is less):     Recertification will be due (POC Duration  / 90 days whichever is less): 2023      Visit # Insurance Allowable Auth Required      []  Yes [x]  No        Functional Scale:  LEFI   Date    10/19/2023  Score   61/80    Latex Allergy:  [x]NO      []YES  Preferred Language for Healthcare:   [x]English       []other:    RESTRICTIONS/PRECAUTIONS:      SUBJECTIVE: Pt has no new complaints. Pain level: 1/10 right knee beginning and 0-1/10 end    Plan Moving Forward/ For next visit:   MLD to bilateral LE   Wrapping   Exercise      OBJECTIVE:   Pulse 73, O2 sat 98%, /70     Exercises/Interventions:     Exercises in bold performed in department today. Items not bolded are carried forward from prior visits for continuity of the record.   Exercise/Activity Sets/ Reps/ Resistance    Comments/ Details HEP     Scifit  5 minutes seat 18 arms 7 subjective    Therapeutic Ex        toe curls, ankle pumps, knee bends and hip ER/IR  1x10       Quad sets and glut sets  1x10 hold 5 secs

## 2023-12-14 ENCOUNTER — HOSPITAL ENCOUNTER (OUTPATIENT)
Dept: PHYSICAL THERAPY | Age: 58
Setting detail: THERAPIES SERIES
Discharge: HOME | End: 2023-12-14
Payer: COMMERCIAL

## 2023-12-14 NOTE — FLOWSHEET NOTE
Physical Therapy  Cancellation/No-show Note  Patient Name:  Roberto Goff  :  1965   Date:  2023  Cancels to Date: 5  No-shows to Date: 0    For today's appointment patient:  [x]  Cancelled  []  Rescheduled appointment  []  No-show     Reason given by patient:  []  Patient ill  []  Conflicting appointment  []  No transportation    [x]  Conflict with work  []  No reason given  []  Other:     Comments:      Electronically signed by:   Trina Reynolds, [address] [place] [address], [id number]

## 2023-12-19 ENCOUNTER — APPOINTMENT (OUTPATIENT)
Dept: PHYSICAL THERAPY | Age: 58
End: 2023-12-19
Payer: COMMERCIAL

## 2023-12-26 ENCOUNTER — TELEPHONE (OUTPATIENT)
Dept: ADMINISTRATIVE | Age: 58
End: 2023-12-26

## 2023-12-26 RX ORDER — TIRZEPATIDE 2.5 MG/.5ML
0.5 INJECTION, SOLUTION SUBCUTANEOUS WEEKLY
Qty: 2 ML | Refills: 0 | Status: SHIPPED | OUTPATIENT
Start: 2023-12-26 | End: 2024-01-29 | Stop reason: SDUPTHER

## 2023-12-26 NOTE — TELEPHONE ENCOUNTER
Submitted PA for Zepbound 2.5MG/0.5ML pen-injectors  Via CMM Key: [id number] STATUS: PENDING. Follow up done daily; if no decision with in three days we will refax. If another three days goes by with no decision will call the insurance for status.

## 2023-12-26 NOTE — TELEPHONE ENCOUNTER
Regarding: Zepbound  Contact: [phone]  ----- Message from Vee Fisher sent at 12/26/2023  1:32 PM EST -----       ----- Message from Tiera Hawkins to Miladis Read PA sent at 12/26/2023 12:30 PM -----   Hello. Thanks for the quick reply. I hope you’ve had a nice holiday.     My phone number is [phone]. Perhaps someone was using the 3 area code?    [facility] at [facility] is preferred. Would also do [facility] in [place] or [facility].      I’m not  to any one of them.    Thanks  Tiera      ----- Message -----       From:NIRAJ JOEL       Sent:12/26/2023 11:57 AM EST         To:Tiera Hawkins    Subject:Zepbound    Hello Tiera,      What pharmacy did you want Zepbound to be sent? Also the phone number we have on your file says its not serviceable number. Can you also provide us best number to reach you? Thank you.      ----- Message -----       From:Tiera Hawkins       Sent:12/26/2023 11:09 AM EST         To:Patient Medical Advice Request Message List    Subject:Zepbound    Hello.  I saw the results from [facility].  No problem.  Is there any update with Epic listing ZepBound?      ----- Message -----       From:Miladis Read       Sent:11/27/2023  8:39 AM EST         To:Tiera Hawkins    Subject:Zepbound    It is still not listed in epic, at this point only mounjaro is in there.     Miladis      ----- Message -----       From:Tiera Hawkins       Sent:11/22/2023 12:32 PM EST         To:Miladis Read    Subject:Zepbound    Hello! It was nice meeting with you yesterday. Thank you for listening and being open to my concerns.  I received an email from Layne Xie today. It appears that ZepBound is now available.  I know there might be an impediment in your system. Can you see if it’s able to be prescribed?  Thank you  Tiera Hawkins

## 2023-12-26 NOTE — TELEPHONE ENCOUNTER
Pt was seen on 11/31/23 as a new patient. Getting prescription for Zepbound was discussed during office visit. Pended prescription for Zepbound.

## 2023-12-27 NOTE — TELEPHONE ENCOUNTER
Message on CMM: Drug is not covered by plan. I called [facility] and spoke to [phone] who confirmed that Lauree Portal is not covered. Devi could not tell me if there are any covered medications. If this requires a response please respond to the pool ( P [phone]). Thank you please advise patient.

## 2024-01-02 ENCOUNTER — TELEPHONE (OUTPATIENT)
Dept: FAMILY MEDICINE CLINIC | Age: 59
End: 2024-01-02

## 2024-01-02 RX ORDER — TIRZEPATIDE 2.5 MG/.5ML
0.5 INJECTION, SOLUTION SUBCUTANEOUS WEEKLY
Qty: 2 ML | Refills: 0 | Status: CANCELLED | OUTPATIENT
Start: 2024-01-02

## 2024-01-02 NOTE — TELEPHONE ENCOUNTER
Regarding: Zepbound  Contact: [phone]  Happy New Year! I hope it’s off to a great start. Let’s move forward with the prescription for ZepBound.  Let’s use either [facility] in [place] or  [facility] at [address] [place] [address].   How hard is it to stop using if I decide I don’t like it or justify the cost? It might be helpful to know before starting.  Thanks!

## 2024-01-02 NOTE — TELEPHONE ENCOUNTER
Pt said that she called [facility] who told her that they did not receive Zepbound prescribed on 12/26/23. Pt was told that [facility] will be called to see if they received the prescription or if there is any issues filling the prescription.

## 2024-01-02 NOTE — TELEPHONE ENCOUNTER
Pharmacy tech at [facility] pharmacy said the Zepbound prescription was received on 12/30/23. The prescription needs PA. Pharmacy tech said they sent PA request which we never received. Left voice mail to inform pt about it.

## 2024-01-29 ENCOUNTER — PATIENT MESSAGE (OUTPATIENT)
Dept: FAMILY MEDICINE CLINIC | Age: 59
End: 2024-01-29

## 2024-01-29 RX ORDER — TIRZEPATIDE 2.5 MG/.5ML
0.5 INJECTION, SOLUTION SUBCUTANEOUS WEEKLY
Qty: 3 ML | Refills: 0 | Status: SHIPPED | OUTPATIENT
Start: 2024-01-29 | End: 2024-02-26

## 2024-01-29 NOTE — TELEPHONE ENCOUNTER
Pt wanted to try compounded Tirzepatide and she wants prescription sent to [facility]. The Zepbound is not covered by insurance. LVM for pt to call back to notify pt that [facility] can't bill insurance.

## 2024-01-29 NOTE — TELEPHONE ENCOUNTER
From: Tiera Hawkins  To: Miladis Read  Sent: 1/29/2024 12:36 PM EST  Subject: Tirzepatide    Hi. I’m going to try this one more time. It’s me, not you. I originally inquired about Zepbound but it’s too expensive.   Could I get a prescription for tir[facility]epatide from [facility]? [phone]  Thank you  Tiera Hawkins

## 2024-03-14 ENCOUNTER — HOSPITAL ENCOUNTER (OUTPATIENT)
Dept: OCCUPATIONAL THERAPY | Age: 59
Setting detail: THERAPIES SERIES
Discharge: HOME | End: 2024-03-14
Payer: COMMERCIAL

## 2024-03-14 PROCEDURE — 97165 OT EVAL LOW COMPLEX 30 MIN: CPT

## 2024-03-14 PROCEDURE — 97140 MANUAL THERAPY 1/> REGIONS: CPT

## 2024-03-14 NOTE — PROGRESS NOTES
{Location:[facility]}     Occupational Therapy Certification      Dear Sherif Gant MD,    We had the pleasure of evaluating the following patient for occupational therapy services at our [facility].  A summary of our findings can be found in the initial assessment below.  This includes our plan of care.  If you have any questions or concerns regarding these findings, please do not hesitate to contact me at the office phone number listed above.  Thank you for the referral.     Physician Signature:_______________________________Date:__________________  By signing above (or electronic signature), therapist’s plan is approved by physician       Initial Evaluation   Patient: Tiera Hawkins (58 y.o. female)   Examination Date: 2024   :  1965 MRN: [id number]   Visit #: Visit count could not be calculated. Make sure you are using a visit which is associated with an episode.    Insurance: Payor: [employer] / Plan: [employer] - OPTIONS / Product Type: *No Product type* /   Insurance ID: [id number] - (Commercial)  Secondary Insurance (if applicable):    Treatment Diagnosis:  {TxDxOT:36409}   Medical Diagnosis:  Lymphedema, not elsewhere classified [I89.0]   Referring Provider: Sherif Gant MD  PCP: Miladis Read PA                                                                  Precautions/ Contra-indications:   Latex allergy:   {YES/NO:}  Pacemaker:     {YES/NO:}  Other: {F/U:57468}    Red Flags:  {RedFlags:31813::\"None\"}    C-SSRS Triggered by Intake questionnaire:   [x] No, Questionnaire did not trigger screening.   [] Yes, Patient intake triggered further evaluation      [] C-SSRS Screening completed  [] PCP notified via Plan of Care  [] Emergency services notified     Preferred Language for Healthcare: {Language:31179::\"English\"}    Review Of Systems (ROS):  [x] Performed Review of systems (Integumentary, CardioPulmonary, Neurological) by intake and

## 2024-03-19 ENCOUNTER — APPOINTMENT (OUTPATIENT)
Dept: OCCUPATIONAL THERAPY | Age: 59
End: 2024-03-19
Payer: COMMERCIAL

## 2024-03-21 ENCOUNTER — HOSPITAL ENCOUNTER (OUTPATIENT)
Dept: OCCUPATIONAL THERAPY | Age: 59
Setting detail: THERAPIES SERIES
Discharge: HOME | End: 2024-03-21
Payer: COMMERCIAL

## 2024-03-21 PROCEDURE — 97140 MANUAL THERAPY 1/> REGIONS: CPT

## 2024-03-21 NOTE — FLOWSHEET NOTE
{good/fair/poor:47980}    Patient requires continued skilled intervention: {YES/NO:26956}      GOALS     *** (cut and paste from eval)    Overall Progression Towards Functional goals/ Treatment Progress Update:  {GOALPROGRESSION:22743}     CHARGE CAPTURE     {CHARGECAPTUREOT:19687}    Charge Justification:  {ChargeJustificationOT:49497}    TREATMENT PLAN   Plan: {OTPOCflow:47106}    Electronically Signed by Misty Valentin OT              Date: 03/21/2024     Note: If patient does not return for scheduled/recommended follow up visits, this note will serve as a discharge from care along with the most recent update on progress.

## 2024-03-26 ENCOUNTER — HOSPITAL ENCOUNTER (OUTPATIENT)
Dept: OCCUPATIONAL THERAPY | Age: 59
Setting detail: THERAPIES SERIES
End: 2024-03-26
Payer: COMMERCIAL

## 2024-03-28 ENCOUNTER — APPOINTMENT (OUTPATIENT)
Dept: PHYSICAL THERAPY | Age: 59
End: 2024-03-28
Payer: COMMERCIAL

## 2024-04-09 ENCOUNTER — HOSPITAL ENCOUNTER (OUTPATIENT)
Dept: PHYSICAL THERAPY | Age: 59
Setting detail: THERAPIES SERIES
Discharge: HOME | End: 2024-04-09
Payer: COMMERCIAL

## 2024-04-09 ENCOUNTER — APPOINTMENT (OUTPATIENT)
Dept: OCCUPATIONAL THERAPY | Age: 59
End: 2024-04-09
Payer: COMMERCIAL

## 2024-04-09 DIAGNOSIS — R60.0 LOCALIZED EDEMA: ICD-10-CM

## 2024-04-09 DIAGNOSIS — R26.9 ABNORMALITY OF GAIT: Primary | ICD-10-CM

## 2024-04-09 PROCEDURE — 97110 THERAPEUTIC EXERCISES: CPT

## 2024-04-09 PROCEDURE — 97161 PT EVAL LOW COMPLEX 20 MIN: CPT

## 2024-04-09 PROCEDURE — 97140 MANUAL THERAPY 1/> REGIONS: CPT

## 2024-04-09 NOTE — PLAN OF CARE
addressing any of the following: body structures, functions (impairments), activity limitations, and/or participation restrictions  [x] A clinical presentation with stable and/or uncomplicated characteristics   [x] Clinical decision making of LOW (48739 - Typically 20 minutes face-to-face) complexity using standardized patient assessment instrument and/or measurable assessment of functional outcome.    EVAL (LOW) 64118 (typically 20 minutes face-to-face)    GOALS     Patient stated goal: ambulating better  Status:  [] Progressing: [] Met: [] Not Met: [] Adjusted    Therapist goals for Patient:   Short Term Goals: To be achieved in: 2 weeks  Independent in HEP and progression per patient tolerance, in order to progress toward full function and prevent re-injury.    Status: [] Progressing: [] Met: [] Not Met: [] Adjusted  Patient will have a decrease in pain to 1/10 to help facilitate improvement in movement, function, and ADLs as indicated by functional deficits.   Status: [] Progressing: [] Met: [] Not Met: [] Adjusted  Pt will demonstrate independence with skin care.     Status: [] Progressing: [] Met: [] Not Met: [] Adjusted    Long Term Goals: To be achieved in: 8-10 weeks  Disability index score of 10% or less for the LLIS to assist with return top prior level of function.    Status: [] Progressing: [] Met: [] Not Met: [] Adjusted  Improve ROM to WNL to allow for proper joint functioning as indicated by patients functional deficits.  Status: [] Progressing: [] Met: [] Not Met: [] Adjusted  Pt to improve strength to 4+/5 or better of proximal hip and quadriceps to allow for proper muscle and joint use in functional mobility, ADLs and prior level of function   Status: [] Progressing: [] Met: [] Not Met: [] Adjusted  Patient will return to  walking around house, walk 2 blocks, and walking on uneven ground  without increased symptoms or restriction to work towards return to prior level of function.       Status: []

## 2024-04-09 NOTE — FLOWSHEET NOTE
[facility]- Outpatient Rehabilitation and Therapy [address], [address], [place] [address] office: [phone] fax: [phone]      Physical Therapy: TREATMENT/PROGRESS NOTE   Patient: Tiera Hawkins (58 y.o. female)   Treatment Date: 2024   :  1965 MRN: [id number]   Visit #: 1   [place]urance Allowable Auth Needed   [place] []Yes    [x]No    Insurance: Payor: [employer] / Plan: [employer] - OPTIONS / Product Type: *No Product type* /   Insurance ID: [id number] - (Commercial)  Secondary Insurance (if applicable):    Treatment Diagnosis:      Medical Diagnosis:    Lymphedema, not elsewhere classified [I89.0]   Referring Physician: Sherif Gant MD  PCP: Miladis Read PA                             Plan of care signed: NO    Date of Patient follow up with Physician:      Progress Report/POC: EVAL today  POC update due: (10 visits /OR AUTH LIMITS, whichever is less)  2024   Precautions/ Contra-indications:   Latex allergy:   No sensitivity to adhesive  Pacemaker:     No  Other:                    N/A  Surgical Date:       3/29/2024 lipo-suction   May 9th surgery next hip/buttock  Red Flags:  None     C-SSRS Triggered by Intake questionnaire:   [x] No, Questionnaire did not trigger screening.   [] Yes, Patient intake triggered further evaluation      [] C-SSRS Screening completed  [] PCP notified via Plan of Care  [] Emergency services notified          Preferred Language for Healthcare:   [x]English       []other:    SUBJECTIVE EXAMINATION     Patient Report/Comments: see eval     Test used Initial score  2024   Pain Summary VAS 10    Functional questionnaire LLIS =33%     Other:                OBJECTIVE EXAMINATION     Observation: See Eval    Test measurements: See Eval    Exercises/Interventions:     Therapeutic Ex (66662)  resistance Sets/time Reps Notes/Cues/Progressions   Toe curls, AP, knee bends and hip ER/IR    Reviewed elevated

## 2024-04-10 ENCOUNTER — APPOINTMENT (OUTPATIENT)
Dept: PHYSICAL THERAPY | Age: 59
End: 2024-04-10
Payer: COMMERCIAL

## 2024-04-11 ENCOUNTER — HOSPITAL ENCOUNTER (OUTPATIENT)
Dept: PHYSICAL THERAPY | Age: 59
Setting detail: THERAPIES SERIES
Discharge: HOME | End: 2024-04-11
Payer: COMMERCIAL

## 2024-04-11 ENCOUNTER — APPOINTMENT (OUTPATIENT)
Dept: OCCUPATIONAL THERAPY | Age: 59
End: 2024-04-11
Payer: COMMERCIAL

## 2024-04-11 PROCEDURE — 97140 MANUAL THERAPY 1/> REGIONS: CPT

## 2024-04-11 PROCEDURE — 97110 THERAPEUTIC EXERCISES: CPT

## 2024-04-11 NOTE — FLOWSHEET NOTE
[facility]- Outpatient Rehabilitation and Therapy [address], [address], [place] [address] office: [phone] fax: [phone]      Physical Therapy: TREATMENT/PROGRESS NOTE   Patient: Tiera Hawkins (58 y.o. female)   Treatment Date: 2024   :  1965 MRN: [id number]   Visit #: 2   [place] Allowable Auth Needed   [place] []Yes    [x]No    Insurance: Payor: [employer]ARE / Plan: [employer]ARE - OPTIONS / Product Type: *No Product type* /   Insurance ID: [id number] - (Commercial)  Secondary Insurance (if applicable):    Treatment Diagnosis:      Medical Diagnosis:    Lymphedema, not elsewhere classified [I89.0]   Referring Physician: Sherif Gant MD  PCP: Miladis Read PA                             Plan of care signed: NO    Date of Patient follow up with Physician:      Progress Report/POC: NO  POC update due: (10 visits /OR AUTH LIMITS, whichever is less)  2024   Precautions/ Contra-indications:   Latex allergy:   No sensitivity to adhesive  Pacemaker:     No  Other:                    N/A  Surgical Date:       3/29/2024 lipo-suction   May 9th surgery next hip/buttock  Red Flags:  None     C-SSRS Triggered by Intake questionnaire:   [x] No, Questionnaire did not trigger screening.   [] Yes, Patient intake triggered further evaluation      [] C-SSRS Screening completed  [] PCP notified via Plan of Care  [] Emergency services notified          Preferred Language for Healthcare:   [x]English       []other:    SUBJECTIVE EXAMINATION     Patient Report/Comments: Pt reports that when she is out of compression she is a little sore. Pt reports that she is not weeping around knee on right leg.    Test used Initial score  2024   Pain Summary VAS 10 3/10   Functional questionnaire LLIS =33%     Other:                OBJECTIVE EXAMINATION     Observation:   O2 Sat 95%, pulse 71, /76 manual    Test measurements:     Exercises/Interventions:

## 2024-04-16 ENCOUNTER — APPOINTMENT (OUTPATIENT)
Dept: PHYSICAL THERAPY | Age: 59
End: 2024-04-16
Payer: COMMERCIAL

## 2024-04-18 ENCOUNTER — HOSPITAL ENCOUNTER (OUTPATIENT)
Dept: PHYSICAL THERAPY | Age: 59
Setting detail: THERAPIES SERIES
Discharge: HOME | End: 2024-04-18
Payer: COMMERCIAL

## 2024-04-18 PROCEDURE — 97140 MANUAL THERAPY 1/> REGIONS: CPT

## 2024-04-18 NOTE — FLOWSHEET NOTE
goals  The patient has a musculoskeletal condition(s) with a corresponding ICD-10 code that is of complexity and severity that require skilled therapeutic intervention. This has a direct and significant impact on the need for therapy and significantly impacts the rate of recovery.     Treatment/Activity Tolerance:  [x] Patient tolerated treatment well [] Patient limited by fatique  [] Patient limited by pain  [] Patient limited by other medical complications  [] Other:     Return to Play: NA    Prognosis for POC: [x] Good [] Fair  [] Poor    Patient requires continued skilled intervention: [x] Yes  [] No    GOALS     Patient stated goal: ambulating better  Status:             [] Progressing: [] Met: [] Not Met: [] Adjusted     Therapist goals for Patient:   Short Term Goals: To be achieved in: 2 weeks  Independent in HEP and progression per patient tolerance, in order to progress toward full function and prevent re-injury.               Status: [] Progressing: [] Met: [] Not Met: [] Adjusted  Patient will have a decrease in pain to 1/10 to help facilitate improvement in movement, function, and ADLs as indicated by functional deficits.              Status: [] Progressing: [] Met: [] Not Met: [] Adjusted  Pt will demonstrate independence with skin care.                Status: [] Progressing: [] Met: [] Not Met: [] Adjusted     Long Term Goals: To be achieved in: 8-10 weeks  Disability index score of 10% or less for the LLIS to assist with return top prior level of function.                  Status: [] Progressing: [] Met: [] Not Met: [] Adjusted  Improve ROM to WNL to allow for proper joint functioning as indicated by patients functional deficits.  Status: [] Progressing: [] Met: [] Not Met: [] Adjusted  Pt to improve strength to 4+/5 or better of proximal hip and quadriceps to allow for proper muscle and joint use in functional mobility, ADLs and prior level of function   Status: [] Progressing: [] Met: [] Not Met:

## 2024-04-23 ENCOUNTER — HOSPITAL ENCOUNTER (OUTPATIENT)
Dept: PHYSICAL THERAPY | Age: 59
Setting detail: THERAPIES SERIES
Discharge: HOME | End: 2024-04-23
Payer: COMMERCIAL

## 2024-04-23 PROCEDURE — 97140 MANUAL THERAPY 1/> REGIONS: CPT

## 2024-04-23 PROCEDURE — 97110 THERAPEUTIC EXERCISES: CPT

## 2024-04-23 NOTE — FLOWSHEET NOTE
Not Met: [] Adjusted  Patient will return to  walking around house, walk 2 blocks, and walking on uneven ground  without increased symptoms or restriction to work towards return to prior level of function.                             Status: [] Progressing: [] Met: [] Not Met: [] Adjusted  Decrease total girth of R 366.3 cm, L 354.7 cm by 5 cm so that pt can return to functional activities including  walk 2 blocks, walking on uneven ground, and up/down 1 flight of stairs  without increased symptoms or restriction.                 Status: [] Progressing: [] Met: [] Not Met: [] Adjusted  Pt will demonstrate independence with donning/doffing compression.                                                     Status: [] Progressing: [] Met: [] Not Met: [] Adjusted         Overall Progression Towards Functional goals/ Treatment Progress Update:  [] Patient is progressing as expected towards functional goals listed.    [] Progression is slowed due to complexities/Impairments listed.  [] Progression has been slowed due to co-morbidities.  [x] Plan just implemented, too soon (<30days) to assess goals progression   [] Goals require adjustment due to lack of progress  [] Patient is not progressing as expected and requires additional follow up with physician  [] Other:     CHARGE CAPTURE     PT CHARGE GRID   CPT Code (TIMED) minutes # CPT Code (UNTIMED) #     Therex (68221)  15 1  EVAL:LOW (34175 - Typically 20 minutes face-to-face)     Neuromusc. Re-ed (61508)    Re-Eval (18373)     Manual (08861) 65 4  Estim Unattended (55067)     Ther. Act (91061)    [facility] (31812[facility]     Gait (00822)    Dry Needle 1-2 muscle (46745)     Aquatic Therex (54828)    Dry Needle 3+ muscle (20561)     Iontophoresis (33092)    VASO (64517)     Ultrasound (66677)    Group Therapy (41643)     Estim Attended (33843)    Canalith Repositioning (72186)     Other:    Other: scifit 5 minutes    Total Timed Code Tx Minutes 80 5       Total Treatment

## 2024-04-25 ENCOUNTER — HOSPITAL ENCOUNTER (OUTPATIENT)
Dept: PHYSICAL THERAPY | Age: 59
Setting detail: THERAPIES SERIES
End: 2024-04-25
Payer: COMMERCIAL

## 2024-04-30 ENCOUNTER — HOSPITAL ENCOUNTER (OUTPATIENT)
Dept: PHYSICAL THERAPY | Age: 59
Setting detail: THERAPIES SERIES
Discharge: HOME | End: 2024-04-30
Payer: COMMERCIAL

## 2024-04-30 PROCEDURE — 97110 THERAPEUTIC EXERCISES: CPT

## 2024-04-30 PROCEDURE — 97140 MANUAL THERAPY 1/> REGIONS: CPT

## 2024-04-30 NOTE — FLOWSHEET NOTE
Status: [] Progressing: [] Met: [] Not Met: [] Adjusted  Patient will return to  walking around house, walk 2 blocks, and walking on uneven ground  without increased symptoms or restriction to work towards return to prior level of function.                             Status: [] Progressing: [] Met: [] Not Met: [] Adjusted  Decrease total girth of R 366.3 cm, L 354.7 cm by 5 cm so that pt can return to functional activities including  walk 2 blocks, walking on uneven ground, and up/down 1 flight of stairs  without increased symptoms or restriction.                 Status: [] Progressing: [] Met: [] Not Met: [] Adjusted  Pt will demonstrate independence with donning/doffing compression.                                                     Status: [] Progressing: [] Met: [] Not Met: [] Adjusted         Overall Progression Towards Functional goals/ Treatment Progress Update:  [] Patient is progressing as expected towards functional goals listed.    [x] Progression is slowed due to complexities/Impairments listed.  [] Progression has been slowed due to co-morbidities.  [x] Plan just implemented, too soon (<30days) to assess goals progression   [] Goals require adjustment due to lack of progress  [] Patient is not progressing as expected and requires additional follow up with physician  [] Other:     CHARGE CAPTURE     PT CHARGE GRID   CPT Code (TIMED) minutes # CPT Code (UNTIMED) #     Therex (09632)  8 1  EVAL:LOW (93039 - Typically 20 minutes face-to-face)     Neuromusc. Re-ed (46740)    Re-Eval (95748)     Manual (83441) 75 5  Estim Unattended (88592)     Ther. Act (77257)    Mech. Traction (37249)     Gait (08548)    Dry Needle 1-2 muscle (28094)     Aquatic Therex (78713)    Dry Needle 3+ muscle (20561)     Iontophoresis (32558)    VASO (89834)     Ultrasound (61400)    Group Therapy (20027)     Estim Attended (30048)    Canalith Repositioning (00533)     Other:    Other: scifit 5 minutes    Total Timed Code Tx

## 2024-05-02 ENCOUNTER — HOSPITAL ENCOUNTER (OUTPATIENT)
Dept: PHYSICAL THERAPY | Age: 59
Setting detail: THERAPIES SERIES
Discharge: HOME | End: 2024-05-02
Payer: COMMERCIAL

## 2024-05-02 PROCEDURE — 97140 MANUAL THERAPY 1/> REGIONS: CPT

## 2024-05-02 PROCEDURE — 97110 THERAPEUTIC EXERCISES: CPT

## 2024-05-02 NOTE — FLOWSHEET NOTE
[facility]- Outpatient Rehabilitation and Therapy [address], [address], [place] [address] office: [phone] fax: [phone]      Physical Therapy: TREATMENT/PROGRESS NOTE   Patient: Tiera Hawkins (58 y.o. female)   Treatment Date: 2024   :  1965 MRN: [id number]   Visit #: 6   Insurance Allowable Auth Needed   MN []Yes    [x]No    Insurance: Payor: [employer] / Plan: [employer] - OPTIONS / Product Type: *No Product type* /   Insurance ID: [id number] - (Commercial)  Secondary Insurance (if applicable):    Treatment Diagnosis:      Medical Diagnosis:    Lymphedema, not elsewhere classified [I89.0]   Referring Physician: Sherif Gant MD  PCP: Miladis Read PA                             Plan of care signed: NO    Date of Patient follow up with Physician:      Progress Report/POC: NO  POC update due: (10 visits /OR AUTH LIMITS, whichever is less)  2024 (2024 secondary to pt going to for next surgery)    Precautions/ Contra-indications:   Latex allergy:   No sensitivity to adhesive  Pacemaker:     No  Other:                    N/A  Surgical Date:       3/29/2024 lipo-suction   May 9th surgery next hip/buttock  Red Flags:  None     C-SSRS Triggered by Intake questionnaire:   [x] No, Questionnaire did not trigger screening.   [] Yes, Patient intake triggered further evaluation      [] C-SSRS Screening completed  [] PCP notified via Plan of Care  [] Emergency services notified          Preferred Language for Healthcare:   [x]English       []other:    SUBJECTIVE EXAMINATION     Patient Report/Comments: Pt reports that right upper thigh inner aspect is softer.      Test used Initial score  2024   Pain Summary VAS 10 10 bilateral LE at knees   Functional questionnaire LLIS =33%     Other:                OBJECTIVE EXAMINATION     Observation:   O2 Sat 97%, pulse 59, /74 manual    Test measurements:     Exercises/Interventions:

## 2024-05-07 ENCOUNTER — HOSPITAL ENCOUNTER (OUTPATIENT)
Dept: PHYSICAL THERAPY | Age: 59
Setting detail: THERAPIES SERIES
Discharge: HOME | End: 2024-05-07
Payer: COMMERCIAL

## 2024-05-07 PROCEDURE — 97140 MANUAL THERAPY 1/> REGIONS: CPT

## 2024-05-07 PROCEDURE — 97110 THERAPEUTIC EXERCISES: CPT

## 2024-05-07 NOTE — PLAN OF CARE
Modalities:    No modalities applied this session instructed to ice 10 minutes bilateral patella region    Education/Home Exercise Program: HEP discussed and performed, see exercise grid      ASSESSMENT     Today's Assessment: POC/PROGRESS UPDATE: Pt. continues to present with functional deficits in ROM, joint mobility, strength symmetry, and flexibility  limiting ability with walking on uneven ground, managing community ambulation, and walking up/down stairs .  During therapy this date, patient required verbal cueing, muscle facilitation, and manual interventions for exercise progression, improving proper muscle recruitment and activation/motor control patterns, modulating pain, increasing ROM, and improving soft tissue extensibility. Patient will continue to benefit from ongoing evaluation and advanced clinical decision from a Physical Therapist to improve pain control, ROM, muscle strength, neuromuscular control, normalization of gait, balance and proprioception, and functional mobility to safely return to PLOF without symptoms or restrictions.       Medical Necessity Documentation:  I certify that this patient meets the below criteria necessary for medical necessity for care and/or justification of therapy services:  The patient has functional impairments and/or activity limitations and would benefit from continued outpatient therapy services to address the deficits outlined in the patients goals  The patient has a musculoskeletal condition(s) with a corresponding ICD-10 code that is of complexity and severity that require skilled therapeutic intervention. This has a direct and significant impact on the need for therapy and significantly impacts the rate of recovery.     Treatment/Activity Tolerance:  [x] Patient tolerated treatment well [] Patient limited by fatique  [] Patient limited by pain  [] Patient limited by other medical complications  [] Other:     Return to Play:

## 2024-05-09 ENCOUNTER — APPOINTMENT (OUTPATIENT)
Dept: PHYSICAL THERAPY | Age: 59
End: 2024-05-09
Payer: COMMERCIAL

## 2024-05-14 ENCOUNTER — HOSPITAL ENCOUNTER (OUTPATIENT)
Dept: PHYSICAL THERAPY | Age: 59
Setting detail: THERAPIES SERIES
Discharge: HOME | End: 2024-05-14
Payer: COMMERCIAL

## 2024-05-14 PROCEDURE — 97140 MANUAL THERAPY 1/> REGIONS: CPT

## 2024-05-14 NOTE — PLAN OF CARE
[facility]- Outpatient Rehabilitation and Therapy [address], [address], [place] [address] office: [phone] fax: [phone]    Dear Sherif Gant MD,     We had the pleasure of re-assessing the following patient for physical therapy services at [facility].  A summary of our findings can be found in the re-assessment below.  This includes our plan of care.  If you have any questions or concerns regarding these findings, please do not hesitate to contact me at the office phone number listed above.  Thank you for the referral.      Physician Signature:_______________________________Date:__________________  By signing above (or electronic signature), therapist’s plan is approved by physician        Physical Therapy: TREATMENT/PROGRESS NOTE   Patient: Tiera Hawkins (58 y.o. female)   Treatment Date: 2024   :  1965 MRN: [id number]   Visit #: 8   Insurance Allowable Auth Needed   MN []Yes    [x]No    Insurance: Payor: [employer] / Plan: [employer] - OPTIONS / Product Type: *No Product type* /   Insurance ID: [id number] - (Commercial)  Secondary Insurance (if applicable):    Treatment Diagnosis:      Medical Diagnosis:    Lymphedema, not elsewhere classified [I89.0]   Referring Physician: Sherif Gant MD  PCP: Miladis Read PA                             Plan of care signed: NO    Date of Patient follow up with Physician:      Progress Report/POC: YES, Date Range for this report: 2024 to 2024 ; 2024  POC update due: (10 visits /OR AUTH LIMITS, whichever is less)  2024    Precautions/ Contra-indications:   Latex allergy:   No sensitivity to adhesive  Pacemaker:     No  Other:                    N/A  Surgical Date:       3/29/2024 lipo-suction;    May 9th surgery next hip/buttock  Red Flags:  None     C-SSRS Triggered by Intake questionnaire:   [x] No, Questionnaire did not trigger screening.   [] Yes, Patient

## 2024-05-21 ENCOUNTER — APPOINTMENT (OUTPATIENT)
Dept: PHYSICAL THERAPY | Age: 59
End: 2024-05-21
Payer: COMMERCIAL

## 2024-05-23 ENCOUNTER — APPOINTMENT (OUTPATIENT)
Dept: PHYSICAL THERAPY | Age: 59
End: 2024-05-23
Payer: COMMERCIAL

## 2024-05-28 ENCOUNTER — HOSPITAL ENCOUNTER (OUTPATIENT)
Dept: PHYSICAL THERAPY | Age: 59
Setting detail: THERAPIES SERIES
Discharge: HOME | End: 2024-05-28
Payer: COMMERCIAL

## 2024-05-28 PROCEDURE — 97110 THERAPEUTIC EXERCISES: CPT

## 2024-05-28 PROCEDURE — 97140 MANUAL THERAPY 1/> REGIONS: CPT

## 2024-05-28 NOTE — FLOWSHEET NOTE
[facility]- Outpatient Rehabilitation and Therapy [address], [address], [place] [address] office: [phone] fax: [phone]      Physical Therapy: TREATMENT/PROGRESS NOTE   Patient: Tiera Hawkins (58 y.o. female)   Treatment Date: 2024   :  1965 MRN: [id number]   Visit #: 9   Insurance Allowable Auth Needed   MN []Yes    [x]No    Insurance: Payor: [employer] / Plan: [employer] - OPTIONS / Product Type: *No Product type* /   Insurance ID: [id number] - (Commercial)  Secondary Insurance (if applicable):    Treatment Diagnosis:      Medical Diagnosis:    Lymphedema, not elsewhere classified [I89.0]   Referring Physician: Sherif Gant MD  PCP: Miladis Read PA                             Plan of care signed: NO    Date of Patient follow up with Physician:      Progress Report/POC: NO; 2024  POC update due: (10 visits /OR AUTH LIMITS, whichever is less)  2024    Precautions/ Contra-indications:   Latex allergy:   No sensitivity to adhesive  Pacemaker:     No  Other:                    N/A  Surgical Date:       3/29/2024 lipo-suction;    May 9th surgery next hip/buttock  Red Flags:  None     C-SSRS Triggered by Intake questionnaire:   [x] No, Questionnaire did not trigger screening.   [] Yes, Patient intake triggered further evaluation      [] C-SSRS Screening completed  [] PCP notified via Plan of Care  [] Emergency services notified          Preferred Language for Healthcare:   [x]English       []other:    SUBJECTIVE EXAMINATION     Patient Report/Comments: Pt reports she was more active yesterday with gardening and back and thigh are sore.      Test used Initial score  2024   Pain Summary  210 back and thigh   Functional questionnaire LLIS =33%     Other:                OBJECTIVE EXAMINATION     Observation:   *O2 Sat 97%, pulse 69, /77    Test measurements:     Exercises/Interventions:     Therapeutic Ex (98063)

## 2024-05-30 ENCOUNTER — HOSPITAL ENCOUNTER (OUTPATIENT)
Dept: PHYSICAL THERAPY | Age: 59
Setting detail: THERAPIES SERIES
Discharge: HOME | End: 2024-05-30
Payer: COMMERCIAL

## 2024-05-30 PROCEDURE — 97140 MANUAL THERAPY 1/> REGIONS: CPT

## 2024-05-30 PROCEDURE — 97110 THERAPEUTIC EXERCISES: CPT

## 2024-05-30 NOTE — FLOWSHEET NOTE
[facility]- Outpatient Rehabilitation and Therapy [address], [address], [place] [address] office: [phone] fax: [phone]      Physical Therapy: TREATMENT/PROGRESS NOTE   Patient: Tiera Hawkins (58 y.o. female)   Treatment Date: 2024   :  1965 MRN: [id number]   Visit #: 10   Insurance Allowable Auth Needed   MN []Yes    [x]No    Insurance: Payor: [employer] / Plan: [employer] - OPTIONS / Product Type: *No Product type* /   Insurance ID: [id number] - (Commercial)  Secondary Insurance (if applicable):    Treatment Diagnosis:      Medical Diagnosis:    Lymphedema, not elsewhere classified [I89.0]   Referring Physician: Sherif Gant MD  PCP: Miladis Read PA                             Plan of care signed: NO    Date of Patient follow up with Physician:      Progress Report/POC: NO; 2024  POC update due: (10 visits /OR AUTH LIMITS, whichever is less)  2024    Precautions/ Contra-indications:   Latex allergy:   No sensitivity to adhesive  Pacemaker:     No  Other:                    N/A  Surgical Date:       3/29/2024 lipo-suction;    May 9th surgery next hip/buttock  Red Flags:  None     C-SSRS Triggered by Intake questionnaire:   [x] No, Questionnaire did not trigger screening.   [] Yes, Patient intake triggered further evaluation      [] C-SSRS Screening completed  [] PCP notified via Plan of Care  [] Emergency services notified          Preferred Language for Healthcare:   [x]English       []other:    SUBJECTIVE EXAMINATION     Patient Report/Comments: Pt reports soreness in low back with working out in yard. Pt reports that she is itching at surgical spots.      Test used Initial score  2024   Pain Summary VAS 4/10 1/10 back    Functional questionnaire LLIS =33%     Other:                OBJECTIVE EXAMINATION     Observation:   *O2 Sat 96%, pulse 77, /85    Test measurements:     Exercises/Interventions:

## 2024-06-04 ENCOUNTER — HOSPITAL ENCOUNTER (OUTPATIENT)
Dept: PHYSICAL THERAPY | Age: 59
Setting detail: THERAPIES SERIES
Discharge: HOME | End: 2024-06-04
Payer: COMMERCIAL

## 2024-06-04 PROCEDURE — 97110 THERAPEUTIC EXERCISES: CPT

## 2024-06-04 PROCEDURE — 97140 MANUAL THERAPY 1/> REGIONS: CPT

## 2024-06-04 NOTE — FLOWSHEET NOTE
[facility]- Outpatient Rehabilitation and Therapy [address], [address], [place] [address] office: [phone] fax: [phone]      Physical Therapy: TREATMENT/PROGRESS NOTE   Patient: Tiera Hawkins (58 y.o. female)   Treatment Date: 2024   :  1965 MRN: [id number]   Visit #: 11   Insurance Allowable Auth Needed   MN []Yes    [x]No    Insurance: Payor: [employer] / Plan: [employer] HE[employer]ARE - OPTIONS / Product Type: *No Product type* /   Insurance ID: [id number] - (Commercial)  Secondary Insurance (if applicable):    Treatment Diagnosis:      Medical Diagnosis:    Lymphedema, not elsewhere classified [I89.0]   Referring Physician: Sherif Gant MD  PCP: Miladis Read PA                             Plan of care signed: NO    Date of Patient follow up with Physician:      Progress Report/POC: NO;  2024 (should have been 2024 last note was 2024)  POC update due: (10 visits /OR AUTH LIMITS, whichever is less)  2024    Precautions/ Contra-indications:   Latex allergy:   No sensitivity to adhesive  Pacemaker:     No  Other:                    N/A  Surgical Date:       3/29/2024 lipo-suction;    May 9th surgery next hip/buttock  Red Flags:  None     C-SSRS Triggered by Intake questionnaire:   [x] No, Questionnaire did not trigger screening.   [] Yes, Patient intake triggered further evaluation      [] C-SSRS Screening completed  [] PCP notified via Plan of Care  [] Emergency services notified          Preferred Language for Healthcare:   [x]English       []other:    SUBJECTIVE EXAMINATION     Patient Report/Comments: Pt reports she is doing better with walking.       Test used Initial score  2024   Pain Summary VAS 4/10 2/10  superficial     Functional questionnaire LLIS =33%     Other:                OBJECTIVE EXAMINATION     Observation:   *O2 Sat 95%, pulse 79, /80    Test measurements:     Exercises/Interventions:

## 2024-06-06 ENCOUNTER — HOSPITAL ENCOUNTER (OUTPATIENT)
Dept: PHYSICAL THERAPY | Age: 59
Setting detail: THERAPIES SERIES
Discharge: HOME | End: 2024-06-06
Payer: COMMERCIAL

## 2024-06-06 PROCEDURE — 97140 MANUAL THERAPY 1/> REGIONS: CPT

## 2024-06-06 PROCEDURE — 97110 THERAPEUTIC EXERCISES: CPT

## 2024-06-11 ENCOUNTER — HOSPITAL ENCOUNTER (OUTPATIENT)
Dept: PHYSICAL THERAPY | Age: 59
Setting detail: THERAPIES SERIES
Discharge: HOME | End: 2024-06-11
Payer: COMMERCIAL

## 2024-06-11 PROCEDURE — 97110 THERAPEUTIC EXERCISES: CPT

## 2024-06-11 PROCEDURE — 97140 MANUAL THERAPY 1/> REGIONS: CPT

## 2024-06-11 NOTE — FLOWSHEET NOTE
ambulation    TREATMENT PLAN   Plan: Cont POC- Continue emphasis/focus on exercise progression, improving proper muscle recruitment and activation/motor control patterns, reduce/eliminate soft tissue swelling/inflammation/restriction, and improving soft tissue extensibility. Next visit plan to add new exercises, progress balance, and continue current phase      Electronically Signed by Devi Harvey, KUSUM              Date: 06/11/2024     Note: If patient does not return for scheduled/recommended follow up visits, this note will serve as a discharge from care along with the most recent update on progress.

## 2024-06-13 ENCOUNTER — APPOINTMENT (OUTPATIENT)
Dept: PHYSICAL THERAPY | Age: 59
End: 2024-06-13
Payer: COMMERCIAL

## 2024-06-18 ENCOUNTER — HOSPITAL ENCOUNTER (OUTPATIENT)
Dept: PHYSICAL THERAPY | Age: 59
Setting detail: THERAPIES SERIES
Discharge: HOME | End: 2024-06-18
Payer: COMMERCIAL

## 2024-06-18 PROCEDURE — 97140 MANUAL THERAPY 1/> REGIONS: CPT

## 2024-06-18 PROCEDURE — 97110 THERAPEUTIC EXERCISES: CPT

## 2024-06-18 NOTE — PLAN OF CARE
(98252) 70 5  Estim Unattended (96281)     Ther. Act (42489)    Mech. Traction (62248)     Gait (63507)    Dry Needle 1-2 muscle (20560)     Aquatic Therex (74903)    Dry Needle 3+ muscle (20561)     Iontophoresis (63138)    VASO (73454)     Ultrasound (23078)    Group Therapy (16089)     Estim Attended (53376)    Canalith Repositioning (29436)     Other:    Other: scifit 5 minutes    Total Timed Code Tx Minutes 85 6       Total Treatment Minutes 85        Charge Justification:  (44763) THERAPEUTIC EXERCISE - Provided verbal/tactile cueing for activities related to strengthening, flexibility, endurance, ROM performed to prevent loss of range of motion, maintain or improve muscular strength or increase flexibility, following either an injury or surgery.   (38073) HOME EXERCISE PROGRAM - Reviewed/Progressed HEP activities related to strengthening, flexibility, endurance, ROM performed to prevent loss of range of motion, maintain or improve muscular strength or increase flexibility, following either an injury or surgery.  (09963) MANUAL THERAPY -  Manual therapy techniques, 1 or more regions, each 15 minutes (Mobilization/manipulation, manual lymphatic drainage, manual traction) for the purpose of modulating pain, promoting relaxation,  increasing ROM, reducing/eliminating soft tissue swelling/inflammation/restriction, improving soft tissue extensibility and allowing for proper ROM for normal function with self care, mobility, lifting and ambulation    TREATMENT PLAN   Plan: Cont POC- Continue emphasis/focus on exercise progression, improving proper muscle recruitment and activation/motor control patterns, reduce/eliminate soft tissue swelling/inflammation/restriction, and improving soft tissue extensibility. Next visit plan to add new exercises, progress balance, and continue current phase  2 times per week for 6 weeks     Electronically Signed by Devi Harvey, KUSUM              Date: 06/18/2024     Note: If patient

## 2024-06-20 ENCOUNTER — APPOINTMENT (OUTPATIENT)
Dept: PHYSICAL THERAPY | Age: 59
End: 2024-06-20
Payer: COMMERCIAL

## 2024-06-25 ENCOUNTER — APPOINTMENT (OUTPATIENT)
Dept: PHYSICAL THERAPY | Age: 59
End: 2024-06-25
Payer: COMMERCIAL

## 2024-06-27 ENCOUNTER — APPOINTMENT (OUTPATIENT)
Dept: PHYSICAL THERAPY | Age: 59
End: 2024-06-27
Payer: COMMERCIAL

## 2024-07-02 ENCOUNTER — HOSPITAL ENCOUNTER (OUTPATIENT)
Dept: PHYSICAL THERAPY | Age: 59
Setting detail: THERAPIES SERIES
Discharge: HOME | End: 2024-07-02
Payer: COMMERCIAL

## 2024-07-02 PROCEDURE — 97110 THERAPEUTIC EXERCISES: CPT

## 2024-07-02 PROCEDURE — 97140 MANUAL THERAPY 1/> REGIONS: CPT

## 2024-07-02 NOTE — FLOWSHEET NOTE
CPT Code (UNTIMED) #     Therex (52692)  15 1  EVAL:LOW (66623 - Typically 20 minutes face-to-face)     Neuromusc. Re-ed (54090)    Re-Eval (26542)     Manual (52758) 70 5  Estim Unattended (01647)     Ther. Act (64077)    [facility] Traction (22586)     Gait (37901)    Dry Needle 1-2 muscle (79694)     Aquatic Therex (31214)    Dry Needle 3+ muscle (20561)     Iontophoresis (84552)    VASO (43070)     Ultrasound (97902)    Group Therapy (04890)     Estim Attended (69925)    Canalith Repositioning (39084)     Other:    Other: scifit 5 minutes    Total Timed Code Tx Minutes 85 6       Total Treatment Minutes 85        Charge Justification:  (25912) THERAPEUTIC EXERCISE - Provided verbal/tactile cueing for activities related to strengthening, flexibility, endurance, ROM performed to prevent loss of range of motion, maintain or improve muscular strength or increase flexibility, following either an injury or surgery.   (41747) HOME EXERCISE PROGRAM - Reviewed/Progressed HEP activities related to strengthening, flexibility, endurance, ROM performed to prevent loss of range of motion, maintain or improve muscular strength or increase flexibility, following either an injury or surgery.  (85246) MANUAL THERAPY -  Manual therapy techniques, 1 or more regions, each 15 minutes (Mobilization/manipulation, manual lymphatic drainage, manual traction) for the purpose of modulating pain, promoting relaxation,  increasing ROM, reducing/eliminating soft tissue swelling/inflammation/restriction, improving soft tissue extensibility and allowing for proper ROM for normal function with self care, mobility, lifting and ambulation    TREATMENT PLAN   Plan: Cont POC- Continue emphasis/focus on exercise progression, improving proper muscle recruitment and activation/motor control patterns, reduce/eliminate soft tissue swelling/inflammation/restriction, and improving soft tissue extensibility. Next visit plan to add new exercises, progress

## 2024-07-09 ENCOUNTER — HOSPITAL ENCOUNTER (OUTPATIENT)
Dept: PHYSICAL THERAPY | Age: 59
Setting detail: THERAPIES SERIES
Discharge: HOME | End: 2024-07-09
Payer: COMMERCIAL

## 2024-07-09 PROCEDURE — 97110 THERAPEUTIC EXERCISES: CPT

## 2024-07-09 PROCEDURE — 97140 MANUAL THERAPY 1/> REGIONS: CPT

## 2024-07-09 NOTE — FLOWSHEET NOTE
facilitate improvement in movement, function, and ADLs as indicated by functional deficits. Since recent surgery.              Status: [x] Progressing: [] Met: [] Not Met: [] Adjusted  Pt will demonstrate independence with skin care.                Status: [] Progressing: [x] Met: [] Not Met: [] Adjusted     Long Term Goals: To be achieved in: 8-10 weeks  Disability index score of 10% or less for the LLIS to assist with return top prior level of function.                  Status: [x] Progressing: [] Met: [] Not Met: [] Adjusted  Improve ROM to WNL to allow for proper joint functioning as indicated by patients functional deficits.  Status: [x] Progressing: [] Met: [] Not Met: [] Adjusted  Pt to improve strength to 4+/5 or better of proximal hip and quadriceps to allow for proper muscle and joint use in functional mobility, ADLs and prior level of function   Status: [x] Progressing: [] Met: [] Not Met: [] Adjusted  Patient will return to  walking around house, walk 2 blocks, and walking on uneven ground  without increased symptoms or restriction to work towards return to prior level of function.                             Status: [x] Progressing: [] Met: [] Not Met: [] Adjusted  Decrease total girth of R 352.1 cm, L 342.8 cm by 5 cm so that pt can return to functional activities including  walk 2 blocks, walking on uneven ground, and up/down 1 flight of stairs  without increased symptoms or restriction.                 Status: [x] Progressing: [] Met: [] Not Met: [] Adjusted  Pt will demonstrate independence with donning/doffing compression.                                                     Status: [x] Progressing: [] Met: [] Not Met: [] Adjusted     Goals starting over secondary to current surgical procedure    Overall Progression Towards Functional goals/ Treatment Progress Update:  [] Patient is progressing as expected towards functional goals listed.    [x] Progression is slowed due to complexities/Impairments

## 2024-07-11 ENCOUNTER — HOSPITAL ENCOUNTER (OUTPATIENT)
Dept: PHYSICAL THERAPY | Age: 59
Setting detail: THERAPIES SERIES
Discharge: HOME | End: 2024-07-11
Payer: COMMERCIAL

## 2024-07-11 PROCEDURE — 97140 MANUAL THERAPY 1/> REGIONS: CPT

## 2024-07-11 NOTE — FLOWSHEET NOTE
[facility]- Outpatient Rehabilitation and Therapy [address], [address], [place] [address] office: [phone] fax: [phone]        Physical Therapy: TREATMENT/PROGRESS NOTE   Patient: Tiera Hawkins (58 y.o. female)   Treatment Date: 2024   :  1965 MRN: [id number]   Visit #: 17   Insurance Allowable Auth Needed   [place] []Yes    [x]No    Insurance: Payor: [employer] / Plan: [employer] HE[employer]ARE - OPTIONS / Product Type: *No Product type* /   Insurance ID: [id number] - (Commercial)  Secondary Insurance (if applicable):    Treatment Diagnosis:      Medical Diagnosis:    Lymphedema, not elsewhere classified [I89.0]   Referring Physician: Sherif Gant MD  PCP: Miladis Read PA                             Plan of care signed: NO    Date of Patient follow up with Physician:      Progress Report/POC: NO next PN 2024  POC update due: (10 visits /OR AUTH LIMITS, whichever is less)  9/10/2024    Precautions/ Contra-indications:   Latex allergy:   No sensitivity to adhesive  Pacemaker:     No  Other:                    N/A  Surgical Date:       3/29/2024 lipo-suction;    May 9th surgery next hip/buttock  Red Flags:  None     C-SSRS Triggered by Intake questionnaire:   [x] No, Questionnaire did not trigger screening.   [] Yes, Patient intake triggered further evaluation      [] C-SSRS Screening completed  [] PCP notified via Plan of Care  [] Emergency services notified          Preferred Language for Healthcare:   [x]English       []other:    SUBJECTIVE EXAMINATION     Patient Report/Comments: Pt reports that she does not have any concerns at this time. Pt reports that she had to leave early secondary to work.    Test used Initial score  2024   Pain Summary VAS 10 0/10; palpation 10   Functional questionnaire LLIS =33%     Other:                OBJECTIVE EXAMINATION     Observation:   One spot on left medial ankle from working out in the yard   *Pulse

## 2024-07-16 ENCOUNTER — HOSPITAL ENCOUNTER (OUTPATIENT)
Dept: PHYSICAL THERAPY | Age: 59
Setting detail: THERAPIES SERIES
Discharge: HOME | End: 2024-07-16
Payer: COMMERCIAL

## 2024-07-16 PROCEDURE — 97140 MANUAL THERAPY 1/> REGIONS: CPT

## 2024-07-16 PROCEDURE — 97110 THERAPEUTIC EXERCISES: CPT

## 2024-07-16 NOTE — FLOWSHEET NOTE
intervention. This has a direct and significant impact on the need for therapy and significantly impacts the rate of recovery.     Treatment/Activity Tolerance:  [x] Patient tolerated treatment well [] Patient limited by fatique  [] Patient limited by pain  [] Patient limited by other medical complications  [] Other:     Return to Play: NA    Prognosis for POC: [x] Good [] Fair  [] Poor    Patient requires continued skilled intervention: [x] Yes  [] No    GOALS     Patient stated goal: ambulating better since recent surgery  Status:             [x] Progressing: [] Met: [] Not Met: [] Adjusted     Therapist goals for Patient:   Short Term Goals: To be achieved in: 2 weeks  Independent in HEP and progression per patient tolerance, in order to progress toward full function and prevent re-injury. Since recent surgery.               Status: [] Progressing: [x] Met: [] Not Met: [] Adjusted  Patient will have a decrease in pain to 1/10 to help facilitate improvement in movement, function, and ADLs as indicated by functional deficits. Since recent surgery.              Status: [] Progressing: [x] Met: [] Not Met: [] Adjusted  Pt will demonstrate independence with skin care.                Status: [] Progressing: [x] Met: [] Not Met: [] Adjusted     Long Term Goals: To be achieved in: 8-10 weeks  Disability index score of 10% or less for the LLIS to assist with return top prior level of function.                  Status: [x] Progressing: [] Met: [] Not Met: [] Adjusted  Improve ROM to WNL to allow for proper joint functioning as indicated by patients functional deficits.  Status: [x] Progressing: [] Met: [] Not Met: [] Adjusted  Pt to improve strength to 4+/5 or better of proximal hip and quadriceps to allow for proper muscle and joint use in functional mobility, ADLs and prior level of function   Status: [x] Progressing: [] Met: [] Not Met: [] Adjusted  Patient will return to  walking around house, walk 2 blocks, and

## 2024-07-18 ENCOUNTER — HOSPITAL ENCOUNTER (OUTPATIENT)
Dept: PHYSICAL THERAPY | Age: 59
Setting detail: THERAPIES SERIES
Discharge: HOME | End: 2024-07-18
Payer: COMMERCIAL

## 2024-07-18 PROCEDURE — 97110 THERAPEUTIC EXERCISES: CPT

## 2024-07-18 PROCEDURE — 97140 MANUAL THERAPY 1/> REGIONS: CPT

## 2024-07-18 NOTE — FLOWSHEET NOTE
exercises, progress balance, and continue current phase  2 times per week for 2 weeks     Electronically Signed by Devi Harvey PT              Date: 07/18/2024     Note: If patient does not return for scheduled/recommended follow up visits, this note will serve as a discharge from care along with the most recent update on progress.

## 2024-07-30 ENCOUNTER — HOSPITAL ENCOUNTER (OUTPATIENT)
Dept: PHYSICAL THERAPY | Age: 59
Setting detail: THERAPIES SERIES
Discharge: HOME | End: 2024-07-30
Payer: COMMERCIAL

## 2024-07-30 PROCEDURE — 97140 MANUAL THERAPY 1/> REGIONS: CPT

## 2024-07-30 NOTE — PLAN OF CARE
(53165)    Dry Needle 3+ muscle (43416)     Iontophoresis (28258)    VASO (59391)     Ultrasound (30267)    Group Therapy (49451)     Estim Attended (82351)    Canalith Repositioning (68743)     Other:    Other: scifit 5 minutes    Total Timed Code Tx Minutes 84 6       Total Treatment Minutes 89        Charge Justification:  (68515) MANUAL THERAPY -  Manual therapy techniques, 1 or more regions, each 15 minutes (Mobilization/manipulation, manual lymphatic drainage, manual traction) for the purpose of modulating pain, promoting relaxation,  increasing ROM, reducing/eliminating soft tissue swelling/inflammation/restriction, improving soft tissue extensibility and allowing for proper ROM for normal function with self care, mobility, lifting and ambulation    TREATMENT PLAN   Plan: Discharge  to [facility] until after next surgery in mid August.        Electronically Signed by Devi Harvey PT              Date: 07/30/2024     Note: If patient does not return for scheduled/recommended follow up visits, this note will serve as a discharge from care along with the most recent update on progress.

## 2024-08-01 ENCOUNTER — APPOINTMENT (OUTPATIENT)
Dept: PHYSICAL THERAPY | Age: 59
End: 2024-08-01
Payer: COMMERCIAL

## 2024-08-15 ENCOUNTER — APPOINTMENT (OUTPATIENT)
Dept: PHYSICAL THERAPY | Age: 59
End: 2024-08-15
Payer: COMMERCIAL

## 2024-08-20 ENCOUNTER — HOSPITAL ENCOUNTER (OUTPATIENT)
Dept: PHYSICAL THERAPY | Age: 59
Setting detail: THERAPIES SERIES
Discharge: HOME | End: 2024-08-20
Payer: COMMERCIAL

## 2024-08-20 DIAGNOSIS — R60.0 LOCALIZED EDEMA: ICD-10-CM

## 2024-08-20 DIAGNOSIS — M54.6 ACUTE BILATERAL THORACIC BACK PAIN: Primary | ICD-10-CM

## 2024-08-20 PROCEDURE — 97140 MANUAL THERAPY 1/> REGIONS: CPT

## 2024-08-20 PROCEDURE — 97164 PT RE-EVAL EST PLAN CARE: CPT

## 2024-08-20 NOTE — PLAN OF CARE
[facility]- Outpatient Rehabilitation and Therapy [address], [address], [place] [address] office: [phone] fax: [phone]     Physical Therapy Initial Evaluation Certification      Dear Sherif Gant MD,    We had the pleasure of evaluating the following patient for physical therapy services at [facility].  A summary of our findings can be found in the initial assessment below.  This includes our plan of care.  If you have any questions or concerns regarding these findings, please do not hesitate to contact me at the office phone number listed above.  Thank you for the referral.     Physician Signature:_______________________________Date:__________________  By signing above (or electronic signature), therapist’s plan is approved by physician       Physical Therapy: TREATMENT/PROGRESS NOTE   Patient: Tiera Hawkins (58 y.o. female)   Examination Date: 2024   :  1965 MRN: [id number]   Visit #: 1   Insurance Allowable Auth Needed    []Yes    [x]No    Insurance: Payor: [employer] / Plan: [employer] HE[employer]CARE - OPTIONS / Product Type: *No Product type* /   Insurance ID: [id number] - (Commercial)  Secondary Insurance (if applicable):    Treatment Diagnosis:    ICD-10-CM    1. Acute bilateral thoracic back pain  M54.6       2. Localized edema  R60.0          Medical Diagnosis:  Lymphedema, not elsewhere classified [I89.0]   Referring Physician: Sherif Gant MD  PCP: Miladis Read PA     Plan of care signed (Y/N):     Date of Patient follow up with Physician:      Plan of Care Report: EVAL today; 2024  POC update due: (10 visits /OR AUTH LIMITS, whichever is less)  2024                                            Medical History:  Comorbidities:  Osteoarthritis  COVID-19  Prior Surgeries: on bilateral LE   Relevant Medical History: Bilateral LE surgeries                                         Precautions/ Contra-indications:

## 2024-08-22 ENCOUNTER — HOSPITAL ENCOUNTER (OUTPATIENT)
Dept: PHYSICAL THERAPY | Age: 59
Setting detail: THERAPIES SERIES
End: 2024-08-22
Payer: COMMERCIAL

## 2024-08-27 ENCOUNTER — HOSPITAL ENCOUNTER (OUTPATIENT)
Dept: PHYSICAL THERAPY | Age: 59
Setting detail: THERAPIES SERIES
Discharge: HOME | End: 2024-08-27
Payer: COMMERCIAL

## 2024-08-27 PROCEDURE — 97140 MANUAL THERAPY 1/> REGIONS: CPT

## 2024-08-27 PROCEDURE — 97110 THERAPEUTIC EXERCISES: CPT

## 2024-08-27 NOTE — FLOWSHEET NOTE
[facility]- Outpatient Rehabilitation and Therapy [address], [address], [place] [address] office: [phone] fax: [phone]       Physical Therapy: TREATMENT/PROGRESS NOTE   Patient: Tiera Hawkins (58 y.o. female)   Examination Date: 2024   :  1965 MRN: [id number]   Visit #: 2   Insurance Allowable Auth Needed    []Yes    [x]No    Insurance: Payor: [employer] / Plan: UNITED HEALTHCARE - OPTIONS / Product Type: *No Product type* /   Insurance ID: [id number] - (Commercial)  Secondary Insurance (if applicable):    Treatment Diagnosis:    ICD-10-CM       1. Acute bilateral thoracic back pain  M54.6         2. Localized edema  R60.0              Medical Diagnosis:  Lymphedema, not elsewhere classified [I89.0]   Referring Physician: Sherif Gant MD  PCP: Miladis Read PA     Plan of care signed (Y/N):     Date of Patient follow up with Physician:      Plan of Care Report: NO; 2024  POC update due: (10 visits /OR AUTH LIMITS, whichever is less)  2024                                            Medical History:  Comorbidities:  Osteoarthritis  COVID-19  Prior Surgeries: on bilateral LE   Relevant Medical History: Bilateral LE surgeries                                         Precautions/ Contra-indications:           Latex allergy:  NO  Pacemaker:    NO  Contraindications for Manipulation: None  Date of Surgery: previous surgeries  Other:    Red Flags:  None    Suicide Screening:   The patient did not verbalize a primary behavioral concern, suicidal ideation, suicidal intent, or demonstrate suicidal behaviors.    Preferred Language for Healthcare:  English    SUBJECTIVE EXAMINATION     Patient stated complaint/comment: Pt reports that she is feeling better but is still having pain in some areas in side and back.     Test used Initial score  2024   Pain Summary VAS 6/10 Beginning 510  End 2-3/10   Functional questionnaire LLIS 10/17 x 25= 15%     Other:                OBJECTIVE EXAMINATION     Observation: Noted fibrosis in side region and back, bilateral.     Exercises/Interventions     Therapeutic Ex (19026)  Resistance Sets/time Reps Notes/Cues/Progressions   Standing hip abduction and extension  3 10    Mini squats   1 10    Mid and low; alternating shoulder extension green 1 10    Triceps and biceps Green  1 10    UE bike   4 min 2/2 Seat 9   NMR re-education (99898)                            Therapeutic Activity (31691)                            Manual Intervention (68281) Time:    MLD trunk region and bilateral UE with clearing to bilateral groin region    Lymphatouch to side and back 80 mm/Hg prior to MLD and well as tissue mobilization         Manual Lymph Drainage (MLD):  MLD to trunk region and bilateral UE, clearing along alternate pathway of bilateral LE to groin lymph nodes    Clear Nodes 10x each   Neck 10   [address]    Axilla 10   Abdomen 5   Groin 10   Popliteal x2    Clear Alternate Pathway 10x each   Re-clear alternate pathway   x5 each position 10   Location        Fluid Mobilization 10x each   Re-clear alternate pathway   x5 each position    Shoulder bracing    Location        Protein Resorption 10x each   Location        Clear Foot/Ankle or Hand 10x each   Achilles    Bilateral malleolus    Fan the cards    Clear dorsum    Clear through web space    Clear toes/fingers    Fluid mobilization    Re-clear all positions  X5 each      Modalities:    No modalities applied this session    Education/Home Exercise Program: Patient HEP program created electronically.  Refer to [employer] access code: [email or web address]    ASSESSMENT     Today's Assessment: During therapy this date, patient required visual cueing, verbal cueing, muscle facilitation, and manual interventions for exercise progression, improving proper muscle recruitment and activation/motor control patterns, reduce/eliminate soft tissue swelling/inflammation/restriction, and improving

## 2024-08-29 ENCOUNTER — HOSPITAL ENCOUNTER (OUTPATIENT)
Dept: PHYSICAL THERAPY | Age: 59
Setting detail: THERAPIES SERIES
Discharge: HOME | End: 2024-08-29
Payer: COMMERCIAL

## 2024-08-29 PROCEDURE — 97140 MANUAL THERAPY 1/> REGIONS: CPT

## 2024-08-29 PROCEDURE — 97110 THERAPEUTIC EXERCISES: CPT

## 2024-08-29 NOTE — FLOWSHEET NOTE
[facility]- Outpatient Rehabilitation and Therapy [address], [address], [place] [address] office: [phone] fax: [phone]       Physical Therapy: TREATMENT/PROGRESS NOTE   Patient: Tiera Hawkins (58 y.o. female)   Examination Date: 2024   :  1965 MRN: [id number]   Visit #: 3   Insurance Allowable Auth Needed    []Yes    [x]No    Insurance: Payor: UNITED HE[employer]ARE / Plan: UNITED HEALTHCARE - OPTIONS / Product Type: *No Product type* /   Insurance ID: [id number] - (Commercial)  Secondary Insurance (if applicable):    Treatment Diagnosis:    ICD-10-CM       1. Acute bilateral thoracic back pain  M54.6         2. Localized edema  R60.0              Medical Diagnosis:  Lymphedema, not elsewhere classified [I89.0]   Referring Physician: Sherif Gant MD  PCP: Miladis Read PA     Plan of care signed (Y/N):     Date of Patient follow up with Physician:      Plan of Care Report: NO; 2024  POC update due: (10 visits /OR AUTH LIMITS, whichever is less)  2024                                            Medical History:  Comorbidities:  Osteoarthritis  COVID-19  Prior Surgeries: on bilateral LE   Relevant Medical History: Bilateral LE surgeries                                         Precautions/ Contra-indications:           Latex allergy:  NO  Pacemaker:    NO  Contraindications for Manipulation: None  Date of Surgery: previous surgeries  Other:    Red Flags:  None    Suicide Screening:   The patient did not verbalize a primary behavioral concern, suicidal ideation, suicidal intent, or demonstrate suicidal behaviors.    Preferred Language for Healthcare:  English    SUBJECTIVE EXAMINATION     Patient stated complaint/comment: Pt reports that she is feeling less pain at spots in back.    Test used Initial score  2024   Pain Summary VAS 6/10 Beginning 3/10  End 2-3/10   Functional questionnaire LLIS 10/17 x 25= 15%    Other:                OBJECTIVE

## 2024-09-03 ENCOUNTER — HOSPITAL ENCOUNTER (OUTPATIENT)
Dept: PHYSICAL THERAPY | Age: 59
Setting detail: THERAPIES SERIES
End: 2024-09-03
Payer: COMMERCIAL

## 2024-09-05 ENCOUNTER — HOSPITAL ENCOUNTER (OUTPATIENT)
Dept: PHYSICAL THERAPY | Age: 59
Setting detail: THERAPIES SERIES
Discharge: HOME | End: 2024-09-05
Payer: COMMERCIAL

## 2024-09-05 PROCEDURE — 97110 THERAPEUTIC EXERCISES: CPT

## 2024-09-05 PROCEDURE — 97140 MANUAL THERAPY 1/> REGIONS: CPT

## 2024-09-05 NOTE — FLOWSHEET NOTE
[] Not Met: [] Adjusted  Patient will demonstrate increased Strength of bilateral trunk region to at least 5/5 throughout without pain to allow for proper functional mobility to enable patient to return to up right positioning.   [] Progressing: [] Met: [] Not Met: [] Adjusted  Patient will return to  Reaching activities and laying down  without increased symptoms or restriction to work towards return to prior level of function.       Status: [] Progressing: [] Met: [] Not Met: [] Adjusted  Decrease total girth of 111.3 by 3 cm so that pt can return to functional activities including  laying flat and on side  without increased symptoms or restriction.    Status: [] Progressing: [] Met: [] Not Met: [] Adjusted  Pt will demonstrate independence with donning/doffing compression.          Status: [] Progressing: [] Met: [] Not Met: [] Adjusted    Overall Progression Towards Functional goals/ Treatment Progress Update:  [] Patient is progressing as expected towards functional goals listed.    [] Progression is slowed due to complexities/Impairments listed.  [] Progression has been slowed due to co-morbidities.  [x] Plan just implemented, too soon (<30days) to assess goals progression   [] Goals require adjustment due to lack of progress  [] Patient is not progressing as expected and requires additional follow up with physician  [] Other:     TREATMENT PLAN     Frequency/Duration: 2x/week for 7 weeks for the following treatment interventions:    Interventions:  Therapeutic Exercise (32944) including: strength training, ROM, and functional mobility  Therapeutic Activities (29307) including: functional mobility training and education.  Neuromuscular Re-education (15268) activation and proprioception, including postural re-education.    Gait Training (98341) for normalization of ambulation patterns and AD training.   Patient education on postural re-education and continuing education on lymphedema    Plan: Cont POC- Continue

## 2024-09-10 ENCOUNTER — APPOINTMENT (OUTPATIENT)
Dept: PHYSICAL THERAPY | Age: 59
End: 2024-09-10
Payer: COMMERCIAL

## 2024-09-12 ENCOUNTER — APPOINTMENT (OUTPATIENT)
Dept: PHYSICAL THERAPY | Age: 59
End: 2024-09-12
Payer: COMMERCIAL

## 2024-09-17 ENCOUNTER — HOSPITAL ENCOUNTER (OUTPATIENT)
Dept: PHYSICAL THERAPY | Age: 59
Setting detail: THERAPIES SERIES
End: 2024-09-17
Payer: COMMERCIAL

## 2024-09-19 ENCOUNTER — HOSPITAL ENCOUNTER (OUTPATIENT)
Dept: PHYSICAL THERAPY | Age: 59
Setting detail: THERAPIES SERIES
End: 2024-09-19
Payer: COMMERCIAL

## 2024-09-24 ENCOUNTER — APPOINTMENT (OUTPATIENT)
Dept: PHYSICAL THERAPY | Age: 59
End: 2024-09-24
Payer: COMMERCIAL

## 2024-09-26 ENCOUNTER — APPOINTMENT (OUTPATIENT)
Dept: PHYSICAL THERAPY | Age: 59
End: 2024-09-26
Payer: COMMERCIAL

## 2024-10-01 ENCOUNTER — APPOINTMENT (OUTPATIENT)
Dept: PHYSICAL THERAPY | Age: 59
End: 2024-10-01
Payer: COMMERCIAL

## 2024-10-04 NOTE — FLOWSHEET NOTE
Physical Therapy  Cancellation/No-show Note  Patient Name:  Aracelis Tolentino  :  1965   Date:  10/31/2023  Cancels to Date: 2  No-shows to Date: 0    For today's appointment patient:  [x]  Cancelled  []  Rescheduled appointment  []  No-show     Reason given by patient:  []  Patient ill  []  Conflicting appointment  []  No transportation    []  Conflict with work  [x]  No reason given  []  Other:     Comments:      Electronically signed by:   Cody Michael, [address] [place] [address], [id number] No

## 2024-10-08 ENCOUNTER — APPOINTMENT (OUTPATIENT)
Dept: PHYSICAL THERAPY | Age: 59
End: 2024-10-08
Payer: COMMERCIAL

## 2024-10-10 ENCOUNTER — HOSPITAL ENCOUNTER (OUTPATIENT)
Dept: PHYSICAL THERAPY | Age: 59
Setting detail: THERAPIES SERIES
Discharge: HOME | End: 2024-10-10
Payer: COMMERCIAL

## 2024-10-10 ENCOUNTER — APPOINTMENT (OUTPATIENT)
Dept: PHYSICAL THERAPY | Age: 59
End: 2024-10-10
Payer: COMMERCIAL

## 2024-10-10 DIAGNOSIS — I89.0 LYMPHEDEMA: Primary | ICD-10-CM

## 2024-10-10 PROCEDURE — 97161 PT EVAL LOW COMPLEX 20 MIN: CPT

## 2024-10-10 PROCEDURE — 97140 MANUAL THERAPY 1/> REGIONS: CPT

## 2024-10-10 NOTE — PLAN OF CARE
[facility]- Outpatient Rehabilitation and Therapy [address], [address], [place] [address] office: [phone] fax: [phone]     Physical Therapy Initial Evaluation Certification      Dear Sherif Gant MD,    We had the pleasure of evaluating the following patient for physical therapy services at [facility].  A summary of our findings can be found in the initial assessment below.  This includes our plan of care.  If you have any questions or concerns regarding these findings, please do not hesitate to contact me at the office phone number listed above.  Thank you for the referral.     Physician Signature:_______________________________Date:__________________  By signing above (or electronic signature), therapist’s plan is approved by physician       Physical Therapy: TREATMENT/PROGRESS NOTE   Patient: Tiera Hawkins (59 y.o. female)   Examination Date: 10/10/2024   :  1965 MRN: [id number]   Visit #: 1   Insurance Allowable Auth Needed   [facility] []Yes    [x]No    Insurance: Payor: UNITED HE[employer]CARE / Plan: UNITED HEALTHCARE - OPTIONS / Product Type: *No Product type* /   Insurance ID: [id number] - (Commercial)  Secondary Insurance (if applicable):    Treatment Diagnosis:    ICD-10-CM    1. Lymphedema, bilateral UE  I89.0          Medical Diagnosis:  Lymphedema, not elsewhere classified [I89.0]   Referring Physician: Sherif Gant MD  PCP: Miladis Read PA     Plan of care signed (Y/N):     Date of Patient follow up with Physician:      Plan of Care Report: EVAL today; POC 2025  POC update due: (10 visits /OR AUTH LIMITS, whichever is less)  2024                                             Medical History:  Comorbidities:  Osteoarthritis  COVID-19  Prior Surgeries: pt has had 4 other surgeries prior to 10/4/2024 for lipedema   Relevant Medical History: see above                                         Precautions/ Contra-indications:

## 2024-10-15 ENCOUNTER — HOSPITAL ENCOUNTER (OUTPATIENT)
Dept: PHYSICAL THERAPY | Age: 59
Setting detail: THERAPIES SERIES
Discharge: HOME | End: 2024-10-15
Payer: COMMERCIAL

## 2024-10-15 ENCOUNTER — APPOINTMENT (OUTPATIENT)
Dept: PHYSICAL THERAPY | Age: 59
End: 2024-10-15
Payer: COMMERCIAL

## 2024-10-15 PROCEDURE — 97110 THERAPEUTIC EXERCISES: CPT

## 2024-10-15 PROCEDURE — 97140 MANUAL THERAPY 1/> REGIONS: CPT

## 2024-10-15 NOTE — FLOWSHEET NOTE
techniques, 1 or more regions, each 15 minutes (Mobilization/manipulation, manual lymphatic drainage, manual traction) for the purpose of modulating pain, promoting relaxation,  increasing ROM, reducing/eliminating soft tissue swelling/inflammation/restriction, improving soft tissue extensibility and allowing for proper ROM for normal function with self care, mobility, lifting and ambulation    GOALS     Patient stated goal: Pt able to use arms with no noted discomfort.  Status: [] Progressing: [] Met: [] Not Met: [] Adjusted    Therapist goals for Patient:   Short Term Goals: To be achieved in: 2 weeks  Independent in HEP and progression per patient tolerance, in order to progress toward full function and prevent re-injury.    Status: [] Progressing: [] Met: [] Not Met: [] Adjusted  Patient will have a decrease in pain to 3/10 to help facilitate improvement in movement, function, and ADLs as indicated by functional deficits.   Status: [] Progressing: [] Met: [] Not Met: [] Adjusted  Pt will demonstrate independence with skin care.     Status: [] Progressing: [] Met: [] Not Met: [] Adjusted    Long Term Goals: To be achieved in: 8-10 weeks  Disability index score of 10% or less for the Upper Extremity functional Scale to assist with reaching prior level of function with activities such as reaching over head.  [] Progressing: [] Met: [] Not Met: [] Adjusted  Patient will demonstrate increased AROM of left shoulder flexion and abduction to 170 without pain to allow for proper joint functioning to enable patient to reach overhead with no difficulty.   [] Progressing: [] Met: [] Not Met: [] Adjusted  Patient will demonstrate increased Strength of bilateral UE to at least 5/5 throughout without pain to allow for proper functional mobility to enable patient to return to all ADL without noted difficulty.   [] Progressing: [] Met: [] Not Met: [] Adjusted  Patient will return to  Reaching activities and Lifting without

## 2024-10-17 ENCOUNTER — APPOINTMENT (OUTPATIENT)
Dept: PHYSICAL THERAPY | Age: 59
End: 2024-10-17
Payer: COMMERCIAL

## 2024-10-22 ENCOUNTER — APPOINTMENT (OUTPATIENT)
Dept: PHYSICAL THERAPY | Age: 59
End: 2024-10-22
Payer: COMMERCIAL

## 2024-10-22 ENCOUNTER — HOSPITAL ENCOUNTER (OUTPATIENT)
Dept: PHYSICAL THERAPY | Age: 59
Setting detail: THERAPIES SERIES
Discharge: HOME | End: 2024-10-22
Payer: COMMERCIAL

## 2024-10-22 PROCEDURE — 97110 THERAPEUTIC EXERCISES: CPT

## 2024-10-22 PROCEDURE — 97140 MANUAL THERAPY 1/> REGIONS: CPT

## 2024-10-22 NOTE — FLOWSHEET NOTE
[facility]- Outpatient Rehabilitation and Therapy [address], [address], [place] [address] office: [phone] fax: [phone]       Physical Therapy: TREATMENT/PROGRESS NOTE   Patient: Tiera Hawkins (59 y.o. female)   Examination Date: 10/22/2024   :  1965 MRN: [id number]   Visit #: 3   Insurance Allowable Auth Needed   [employer] []Yes    [x]No    Insurance: Payor: UNITED HEALTHCARE / Plan: UNITED HEALTHCARE - OPTIONS / Product Type: *No Product type* /   Insurance ID: [id number] - (Commercial)  Secondary Insurance (if applicable):    Treatment Diagnosis:    ICD-10-CM    1. Lymphedema, bilateral UE  I89.0          Medical Diagnosis:  Lymphedema, not elsewhere classified [I89.0]   Referring Physician: Sherif Gant MD  PCP: Miladis Read PA     Plan of care signed (Y/N):     Date of Patient follow up with Physician:      Plan of Care Report: NO; POC 2025  POC update due: (10 visits /OR AUTH LIMITS, whichever is less)  2024                                             Medical History:  Comorbidities:  Osteoarthritis  COVID-19  Prior Surgeries: pt has had 4 other surgeries prior to 10/4/2024 for lipedema   Relevant Medical History: see above                                         Precautions/ Contra-indications:           Latex allergy:  NO  Pacemaker:    NO  Contraindications for Manipulation: None  Date of Surgery: 10/4/2024 bilateral UE for lipedema  Other:    Red Flags:  None    Suicide Screening:   The patient did not verbalize a primary behavioral concern, suicidal ideation, suicidal intent, or demonstrate suicidal behaviors.    Preferred Language for Healthcare:  English    SUBJECTIVE EXAMINATION     Patient stated complaint/comment: Pt reports itching and blisters in bilateral UE but now peeling.    Test used Initial score  10/10/24 10/22/2024   Pain Summary VAS 6-7/10 0/10   Functional questionnaire Upper Extremity functional Scale 59/80=73=27%    Other:

## 2024-10-24 ENCOUNTER — HOSPITAL ENCOUNTER (OUTPATIENT)
Dept: PHYSICAL THERAPY | Age: 59
Setting detail: THERAPIES SERIES
Discharge: HOME | End: 2024-10-24
Payer: COMMERCIAL

## 2024-10-24 ENCOUNTER — APPOINTMENT (OUTPATIENT)
Dept: PHYSICAL THERAPY | Age: 59
End: 2024-10-24
Payer: COMMERCIAL

## 2024-10-24 PROCEDURE — 97140 MANUAL THERAPY 1/> REGIONS: CPT

## 2024-10-24 PROCEDURE — 97110 THERAPEUTIC EXERCISES: CPT

## 2024-10-24 NOTE — FLOWSHEET NOTE
proprioception, including postural re-education.    Manual Therapy (57342) as indicated to include: Gr I-IV mobilizations, Soft Tissue Mobilization, Manual Lymph Drainage, Trigger Point Release, and Myofascial Release  Patient education on postural re-education and control of edema    Plan: Cont POC- Continue emphasis/focus on exercise progression, improving proper muscle recruitment and activation/motor control patterns, reduce/eliminate soft tissue swelling/inflammation/restriction, and improving soft tissue extensibility. Next visit plan to add new exercises     Electronically Signed by Devi Harvey, KUSUM  Date: 10/24/2024     Note: Portions of this note have been templated and/or copied from initial evaluation, reassessments and prior notes for documentation efficiency.    Note: If patient does not return for scheduled/recommended follow up visits, this note will serve as a discharge from care along with the most recent update on progress.    Lymphedema Evaluation

## 2024-10-29 ENCOUNTER — APPOINTMENT (OUTPATIENT)
Dept: PHYSICAL THERAPY | Age: 59
End: 2024-10-29
Payer: COMMERCIAL

## 2024-10-31 ENCOUNTER — APPOINTMENT (OUTPATIENT)
Dept: PHYSICAL THERAPY | Age: 59
End: 2024-10-31
Payer: COMMERCIAL

## 2024-10-31 ENCOUNTER — HOSPITAL ENCOUNTER (OUTPATIENT)
Dept: PHYSICAL THERAPY | Age: 59
Setting detail: THERAPIES SERIES
Discharge: HOME | End: 2024-10-31
Payer: COMMERCIAL

## 2024-10-31 PROCEDURE — 97110 THERAPEUTIC EXERCISES: CPT

## 2024-10-31 PROCEDURE — 97140 MANUAL THERAPY 1/> REGIONS: CPT

## 2024-10-31 NOTE — FLOWSHEET NOTE
[facility]- Outpatient Rehabilitation and Therapy [address], [address], [place] [address] office: [phone] fax: [phone]       Physical Therapy: TREATMENT/PROGRESS NOTE   Patient: Tiera Hawkins (59 y.o. female)   Examination Date: 10/31/2024   :  1965 MRN: [id number]   Visit #: 5   Insurance Allowable Auth Needed   UHC []Yes    [x]No    Insurance: Payor: UNITED [employer]ARE / Plan: UNITED HEALTHCARE - OPTIONS / Product Type: *No Product type* /   Insurance ID: [id number] - (Commercial)  Secondary Insurance (if applicable):    Treatment Diagnosis:    ICD-10-CM    1. Lymphedema, bilateral UE  I89.0          Medical Diagnosis:  Lymphedema, not elsewhere classified [I89.0]   Referring Physician: Sherif Gant MD  PCP: Miladis Read PA     Plan of care signed (Y/N):     Date of Patient follow up with Physician:      Plan of Care Report: NO; POC 2025  POC update due: (10 visits /OR AUTH LIMITS, whichever is less)  2024                                             Medical History:  Comorbidities:  Osteoarthritis  COVID-19  Prior Surgeries: pt has had 4 other surgeries prior to 10/4/2024 for lipedema   Relevant Medical History: see above                                         Precautions/ Contra-indications:           Latex allergy:  NO  Pacemaker:    NO  Contraindications for Manipulation: None  Date of Surgery: 10/4/2024 bilateral UE for lipedema  Other:    Red Flags:  None    Suicide Screening:   The patient did not verbalize a primary behavioral concern, suicidal ideation, suicidal intent, or demonstrate suicidal behaviors.    Preferred Language for Healthcare:  English    SUBJECTIVE EXAMINATION     Patient stated complaint/comment: Pt reports that she is fatigued today.      Test used Initial score  10/10/24 10/31/2024   Pain Summary VAS 6-7/10 0/10  Worst 2/10 with touch   Functional questionnaire Upper Extremity functional Scale 59/80=73=27%    Other:

## 2024-11-05 ENCOUNTER — APPOINTMENT (OUTPATIENT)
Dept: PHYSICAL THERAPY | Age: 59
End: 2024-11-05
Payer: COMMERCIAL

## 2024-11-07 ENCOUNTER — HOSPITAL ENCOUNTER (OUTPATIENT)
Dept: PHYSICAL THERAPY | Age: 59
Setting detail: THERAPIES SERIES
Discharge: HOME | End: 2024-11-07
Payer: COMMERCIAL

## 2024-11-07 ENCOUNTER — APPOINTMENT (OUTPATIENT)
Dept: PHYSICAL THERAPY | Age: 59
End: 2024-11-07
Payer: COMMERCIAL

## 2024-11-07 PROCEDURE — 97140 MANUAL THERAPY 1/> REGIONS: CPT

## 2024-11-07 NOTE — PLAN OF CARE
Adjusted    Therapist goals for Patient:   Short Term Goals: To be achieved in: 2 weeks  Independent in HEP and progression per patient tolerance, in order to progress toward full function and prevent re-injury.    Status: [] Progressing: [x] Met: [] Not Met: [] Adjusted  Patient will have a decrease in pain to 3/10 to help facilitate improvement in movement, function, and ADLs as indicated by functional deficits.   Status: [] Progressing: [x] Met: [] Not Met: [] Adjusted  Pt will demonstrate independence with skin care.     Status: [] Progressing: [x] Met: [] Not Met: [] Adjusted    Long Term Goals: To be achieved in: 8-10 weeks  Disability index score of 10% or less for the Upper Extremity functional Scale to assist with reaching prior level of function with activities such as reaching over head.  [] Progressing: [x] Met: [] Not Met: [] Adjusted  Patient will demonstrate increased AROM of left shoulder flexion and abduction to 170 without pain to allow for proper joint functioning to enable patient to reach overhead with no difficulty.   [x] Progressing: [] Met: [] Not Met: [] Adjusted  Patient will demonstrate increased Strength of bilateral UE to at least 5/5 throughout without pain to allow for proper functional mobility to enable patient to return to all ADL without noted difficulty.   [x] Progressing: [] Met: [] Not Met: [] Adjusted  Patient will return to  Reaching activities and Lifting without increased symptoms or restriction to work towards return to prior level of function.       Status: [x] Progressing: [] Met: [] Not Met: [] Adjusted  Decrease total girth of bilateral UE by 5 cm so that pt can return to functional activities including  Reaching activities and Lifting without increased symptoms or restriction.    Status: [x] Progressing: [] Met: [] Not Met: [] Adjusted  Pt will demonstrate independence with donning/doffing compression.          Status: [] Progressing: [x] Met: [] Not Met: []

## 2024-11-12 ENCOUNTER — HOSPITAL ENCOUNTER (OUTPATIENT)
Dept: PHYSICAL THERAPY | Age: 59
Setting detail: THERAPIES SERIES
Discharge: HOME | End: 2024-11-12
Payer: COMMERCIAL

## 2024-11-12 ENCOUNTER — APPOINTMENT (OUTPATIENT)
Dept: PHYSICAL THERAPY | Age: 59
End: 2024-11-12
Payer: COMMERCIAL

## 2024-11-12 PROCEDURE — 97140 MANUAL THERAPY 1/> REGIONS: CPT

## 2024-11-12 PROCEDURE — 97110 THERAPEUTIC EXERCISES: CPT

## 2024-11-12 NOTE — FLOWSHEET NOTE
[facility]- Outpatient Rehabilitation and Therapy [address], [address], [place] [address] office: [phone] fax: [phone]       Physical Therapy: TREATMENT/PROGRESS NOTE   Patient: Tiera Hawkins (59 y.o. female)   Examination Date: 2024   :  1965 MRN: [id number]   Visit #: 7   Insurance Allowable Auth Needed   UHC []Yes    [x]No    Insurance: Payor: UNITED HEALTHCARE / Plan: UNITED HEALTHCARE - CHOICE PLUS / Product Type: *No Product type* /   Insurance ID: [id number] - (Commercial)  Secondary Insurance (if applicable):    Treatment Diagnosis:    ICD-10-CM    1. Lymphedema, bilateral UE  I89.0          Medical Diagnosis:  Lymphedema, not elsewhere classified [I89.0]   Referring Physician: Sherif Gant MD  PCP: Miladis Read PA     Plan of care signed (Y/N):     Date of Patient follow up with Physician:      Plan of Care Report: NO; POC 2025  POC update due: (10 visits /OR AUTH LIMITS, whichever is less)  2024                                              Medical History:  Comorbidities:  Osteoarthritis  COVID-19  Prior Surgeries: pt has had 4 other surgeries prior to 10/4/2024 for lipedema   Relevant Medical History: see above                                         Precautions/ Contra-indications:           Latex allergy:  NO  Pacemaker:    NO  Contraindications for Manipulation: None  Date of Surgery: 10/4/2024 bilateral UE for lipedema  Other:    Red Flags:  None    Suicide Screening:   The patient did not verbalize a primary behavioral concern, suicidal ideation, suicidal intent, or demonstrate suicidal behaviors.    Preferred Language for Healthcare:  English    SUBJECTIVE EXAMINATION     Patient stated complaint/comment: Pt reports that she was able to clean her home including deep cleaning without issue.       Test used Initial score  10/10/24 2024   Pain Summary VAS 6-7/10 0/10 pt reports elbow area increased at time with 2-3/10   Functional

## 2024-11-14 ENCOUNTER — HOSPITAL ENCOUNTER (OUTPATIENT)
Dept: PHYSICAL THERAPY | Age: 59
Setting detail: THERAPIES SERIES
Discharge: HOME | End: 2024-11-14
Payer: COMMERCIAL

## 2024-11-14 ENCOUNTER — APPOINTMENT (OUTPATIENT)
Dept: PHYSICAL THERAPY | Age: 59
End: 2024-11-14
Payer: COMMERCIAL

## 2024-11-14 PROCEDURE — 97110 THERAPEUTIC EXERCISES: CPT

## 2024-11-14 PROCEDURE — 97140 MANUAL THERAPY 1/> REGIONS: CPT

## 2024-11-19 ENCOUNTER — HOSPITAL ENCOUNTER (OUTPATIENT)
Dept: PHYSICAL THERAPY | Age: 59
Setting detail: THERAPIES SERIES
Discharge: HOME | End: 2024-11-19
Payer: COMMERCIAL

## 2024-11-19 PROCEDURE — 97140 MANUAL THERAPY 1/> REGIONS: CPT

## 2024-11-19 PROCEDURE — 97110 THERAPEUTIC EXERCISES: CPT

## 2024-11-19 NOTE — FLOWSHEET NOTE
Adjusted  Patient will demonstrate increased AROM of left shoulder flexion and abduction to 170 without pain to allow for proper joint functioning to enable patient to reach overhead with no difficulty.   [x] Progressing: [] Met: [] Not Met: [] Adjusted  Patient will demonstrate increased Strength of bilateral UE to at least 5/5 throughout without pain to allow for proper functional mobility to enable patient to return to all ADL without noted difficulty.   [x] Progressing: [] Met: [] Not Met: [] Adjusted  Patient will return to  Reaching activities and Lifting without increased symptoms or restriction to work towards return to prior level of function.       Status: [x] Progressing: [] Met: [] Not Met: [] Adjusted  Decrease total girth of bilateral UE by 5 cm so that pt can return to functional activities including  Reaching activities and Lifting without increased symptoms or restriction.    Status: [x] Progressing: [] Met: [] Not Met: [] Adjusted  Pt will demonstrate independence with donning/doffing compression.          Status: [] Progressing: [x] Met: [] Not Met: [] Adjusted    Overall Progression Towards Functional goals/ Treatment Progress Update:  [x] Patient is progressing as expected towards functional goals listed.    [] Progression is slowed due to complexities/Impairments listed.  [] Progression has been slowed due to co-morbidities.  [] Plan just implemented, too soon (<30days) to assess goals progression   [] Goals require adjustment due to lack of progress  [] Patient is not progressing as expected and requires additional follow up with physician  [] Other:     TREATMENT PLAN     Frequency/Duration: 1-2x/week for 2 weeks for the following treatment interventions:    Interventions:  Therapeutic Exercise (99709) including: strength training, ROM, and functional mobility  Neuromuscular Re-education (96793) activation and proprioception, including postural re-education.    Manual Therapy (77536) as

## 2024-11-21 ENCOUNTER — APPOINTMENT (OUTPATIENT)
Dept: PHYSICAL THERAPY | Age: 59
End: 2024-11-21
Payer: COMMERCIAL

## 2024-11-26 ENCOUNTER — HOSPITAL ENCOUNTER (OUTPATIENT)
Dept: PHYSICAL THERAPY | Age: 59
Setting detail: THERAPIES SERIES
Discharge: HOME | End: 2024-11-26
Payer: COMMERCIAL

## 2024-11-26 PROCEDURE — 97140 MANUAL THERAPY 1/> REGIONS: CPT

## 2024-11-26 NOTE — PLAN OF CARE
[facility]- Outpatient Rehabilitation and Therapy [address], [address], [place] [address] office: [phone] fax: [phone]     Dear Sherif Gant MD,     Please see discharge summary.  Thank you for the referral.      Physician Signature:_______________________________Date:__________________  By signing above (or electronic signature), therapist’s plan is approved by physician       Physical Therapy: TREATMENT/PROGRESS NOTE   Patient: Tiera Hawkins (59 y.o. female)   Examination Date: 2024   :  1965 MRN: [id number]   Visit #: 10   Insurance Allowable Auth Needed   UHC []Yes    [x]No    Insurance: Payor: UNITED HEALTHCARE / Plan: UNITED HEALTHCARE - CHOICE PLUS / Product Type: *No Product type* /   Insurance ID: [id number] - (Commercial)  Secondary Insurance (if applicable):    Treatment Diagnosis:    ICD-10-CM    1. Lymphedema, bilateral UE  I89.0          Medical Diagnosis:  Lymphedema, not elsewhere classified [I89.0]   Referring Physician: Sherif Gant MD  PCP: Miladis Read PA     Plan of care signed (Y/N):     Date of Patient follow up with Physician:      Plan of Care Report: NO; POC 2025  POC update due: (10 visits /OR AUTH LIMITS, whichever is less)  2024                                              Medical History:  Comorbidities:  Osteoarthritis  COVID-19  Prior Surgeries: pt has had 4 other surgeries prior to 10/4/2024 for lipedema   Relevant Medical History: see above                                         Precautions/ Contra-indications:           Latex allergy:  NO  Pacemaker:    NO  Contraindications for Manipulation: None  Date of Surgery: 10/4/2024 bilateral UE for lipedema  Other:    Red Flags:  None    Suicide Screening:   The patient did not verbalize a primary behavioral concern, suicidal ideation, suicidal intent, or demonstrate suicidal behaviors.    Preferred Language for Healthcare:  English    SUBJECTIVE EXAMINATION

## 2024-11-28 ENCOUNTER — APPOINTMENT (OUTPATIENT)
Dept: PHYSICAL THERAPY | Age: 59
End: 2024-11-28
Payer: COMMERCIAL

## 2025-04-06 ENCOUNTER — TELEMEDICINE ON DEMAND (OUTPATIENT)
Age: 60
End: 2025-04-06
Payer: COMMERCIAL

## 2025-04-06 DIAGNOSIS — H10.10 ALLERGIC CONJUNCTIVITIS, UNSPECIFIED LATERALITY: Primary | ICD-10-CM

## 2025-04-06 PROCEDURE — 1036F TOBACCO NON-USER: CPT | Performed by: NURSE PRACTITIONER

## 2025-04-06 PROCEDURE — 99213 OFFICE O/P EST LOW 20 MIN: CPT | Performed by: NURSE PRACTITIONER

## 2025-04-06 PROCEDURE — 3017F COLORECTAL CA SCREEN DOC REV: CPT | Performed by: NURSE PRACTITIONER

## 2025-04-06 PROCEDURE — G8427 DOCREV CUR MEDS BY ELIG CLIN: HCPCS | Performed by: NURSE PRACTITIONER

## 2025-04-06 PROCEDURE — G8421 BMI NOT CALCULATED: HCPCS | Performed by: NURSE PRACTITIONER

## 2025-04-06 RX ORDER — OLOPATADINE HYDROCHLORIDE 1 MG/ML
1 SOLUTION/ DROPS OPHTHALMIC 2 TIMES DAILY
Qty: 1 EACH | Refills: 0 | Status: SHIPPED | OUTPATIENT
Start: 2025-04-06

## 2025-06-15 ASSESSMENT — PATIENT HEALTH QUESTIONNAIRE - PHQ9
SUM OF ALL RESPONSES TO PHQ9 QUESTIONS 1 & 2: 0
2. FEELING DOWN, DEPRESSED OR HOPELESS: NOT AT ALL
SUM OF ALL RESPONSES TO PHQ QUESTIONS 1-9: 0
SUM OF ALL RESPONSES TO PHQ QUESTIONS 1-9: 0
2. FEELING DOWN, DEPRESSED OR HOPELESS: NOT AT ALL
SUM OF ALL RESPONSES TO PHQ QUESTIONS 1-9: 0
1. LITTLE INTEREST OR PLEASURE IN DOING THINGS: NOT AT ALL
1. LITTLE INTEREST OR PLEASURE IN DOING THINGS: NOT AT ALL
SUM OF ALL RESPONSES TO PHQ QUESTIONS 1-9: 0

## 2025-06-16 ENCOUNTER — TELEPHONE (OUTPATIENT)
Dept: FAMILY MEDICINE CLINIC | Age: 60
End: 2025-06-16

## 2025-06-16 ENCOUNTER — OFFICE VISIT (OUTPATIENT)
Dept: FAMILY MEDICINE CLINIC | Age: 60
End: 2025-06-16
Payer: COMMERCIAL

## 2025-06-16 VITALS
RESPIRATION RATE: 16 BRPM | HEART RATE: 74 BPM | HEIGHT: 64 IN | WEIGHT: 227 LBS | DIASTOLIC BLOOD PRESSURE: 78 MMHG | OXYGEN SATURATION: 96 % | SYSTOLIC BLOOD PRESSURE: 120 MMHG | BODY MASS INDEX: 38.76 KG/M2

## 2025-06-16 DIAGNOSIS — Z00.00 ANNUAL PHYSICAL EXAM: Primary | ICD-10-CM

## 2025-06-16 DIAGNOSIS — Z12.39 ENCOUNTER FOR BREAST CANCER SCREENING USING NON-MAMMOGRAM MODALITY: ICD-10-CM

## 2025-06-16 DIAGNOSIS — E66.812 CLASS 2 SEVERE OBESITY WITH SERIOUS COMORBIDITY AND BODY MASS INDEX (BMI) OF 38.0 TO 38.9 IN ADULT, UNSPECIFIED OBESITY TYPE (HCC): ICD-10-CM

## 2025-06-16 DIAGNOSIS — H04.551 OBSTRUCTION OF RIGHT TEAR DUCT: ICD-10-CM

## 2025-06-16 DIAGNOSIS — E66.01 CLASS 2 SEVERE OBESITY WITH SERIOUS COMORBIDITY AND BODY MASS INDEX (BMI) OF 38.0 TO 38.9 IN ADULT, UNSPECIFIED OBESITY TYPE (HCC): ICD-10-CM

## 2025-06-16 LAB
ALBUMIN SERPL-MCNC: 4.2 G/DL (ref 3.4–5)
ALBUMIN/GLOB SERPL: 1.5 {RATIO} (ref 1.1–2.2)
ALP SERPL-CCNC: 55 U/L (ref 40–129)
ALT SERPL-CCNC: 23 U/L (ref 10–40)
ANION GAP SERPL CALCULATED.3IONS-SCNC: 13 MMOL/L (ref 3–16)
AST SERPL-CCNC: 28 U/L (ref 15–37)
BILIRUB SERPL-MCNC: 0.4 MG/DL (ref 0–1)
BUN SERPL-MCNC: 13 MG/DL (ref 7–20)
CALCIUM SERPL-MCNC: 9.2 MG/DL (ref 8.3–10.6)
CHLORIDE SERPL-SCNC: 103 MMOL/L (ref 99–110)
CHOLEST SERPL-MCNC: 172 MG/DL (ref 0–199)
CO2 SERPL-SCNC: 22 MMOL/L (ref 21–32)
CREAT SERPL-MCNC: 0.7 MG/DL (ref 0.6–1.1)
GFR SERPLBLD CREATININE-BSD FMLA CKD-EPI: >90 ML/MIN/{1.73_M2}
GLUCOSE SERPL-MCNC: 98 MG/DL (ref 70–99)
HDLC SERPL-MCNC: 50 MG/DL (ref 40–60)
LDLC SERPL CALC-MCNC: 106 MG/DL
POTASSIUM SERPL-SCNC: 4.2 MMOL/L (ref 3.5–5.1)
PROT SERPL-MCNC: 7 G/DL (ref 6.4–8.2)
SODIUM SERPL-SCNC: 138 MMOL/L (ref 136–145)
TRIGL SERPL-MCNC: 82 MG/DL (ref 0–150)
VLDLC SERPL CALC-MCNC: 16 MG/DL

## 2025-06-16 PROCEDURE — 99396 PREV VISIT EST AGE 40-64: CPT | Performed by: PHYSICIAN ASSISTANT

## 2025-06-16 PROCEDURE — 90715 TDAP VACCINE 7 YRS/> IM: CPT | Performed by: PHYSICIAN ASSISTANT

## 2025-06-16 PROCEDURE — G8427 DOCREV CUR MEDS BY ELIG CLIN: HCPCS | Performed by: PHYSICIAN ASSISTANT

## 2025-06-16 PROCEDURE — G8417 CALC BMI ABV UP PARAM F/U: HCPCS | Performed by: PHYSICIAN ASSISTANT

## 2025-06-16 PROCEDURE — 90471 IMMUNIZATION ADMIN: CPT | Performed by: PHYSICIAN ASSISTANT

## 2025-06-16 PROCEDURE — 1036F TOBACCO NON-USER: CPT | Performed by: PHYSICIAN ASSISTANT

## 2025-06-16 PROCEDURE — 3017F COLORECTAL CA SCREEN DOC REV: CPT | Performed by: PHYSICIAN ASSISTANT

## 2025-06-16 PROCEDURE — 99213 OFFICE O/P EST LOW 20 MIN: CPT | Performed by: PHYSICIAN ASSISTANT

## 2025-06-16 SDOH — ECONOMIC STABILITY: FOOD INSECURITY: WITHIN THE PAST 12 MONTHS, THE FOOD YOU BOUGHT JUST DIDN'T LAST AND YOU DIDN'T HAVE MONEY TO GET MORE.: NEVER TRUE

## 2025-06-16 SDOH — ECONOMIC STABILITY: FOOD INSECURITY: WITHIN THE PAST 12 MONTHS, YOU WORRIED THAT YOUR FOOD WOULD RUN OUT BEFORE YOU GOT MONEY TO BUY MORE.: NEVER TRUE

## 2025-06-16 NOTE — PROGRESS NOTES
History and Physical      Tiera Hawkins  YOB: 1965    Date of Service:  6/16/2025    Chief Complaint:   Tiera Hawkins is a 59 y.o. female who presents for complete physical examination.    HPI: Overall feeling well. Has had some excessive tearing right eye intermittently. Asked at [facility] and told it was normal. Currently has inflammation over lacrimal duct, erythema and fullness.   Also concerned with weight, would like to start zepbound,   History lipedema, had 5 surgeries last year and has been helpful. Can not tolerate mammogram secondary to lipedema and would like some type of alternative screening.     Wt Readings from Last 3 Encounters:   06/16/25 103 kg (227 lb)   11/21/23 110 kg (242 lb 9.6 oz)     BP Readings from Last 3 Encounters:   06/16/25 120/78   11/21/23 112/84       Patient Active Problem List   Diagnosis    Lipedema       PREVENTIVE HEALTH:   Last eye exam:    yearly-   Hearing concerns: No  Last Dental exam:    Every 6 Months  Caffeine use:  1-3/ day  Exercise:  walking  Diet: feels overall healthy  Alcohol use: 2-3/ week  Tobacco/ Vapping/ marijuana use:  no  Drug use: no  Mental Health; concerns of anxiety or depression?  no.  PHQ-9 Total Score: (Patient-Rptd) 0 (6/15/2025  3:20 PM)     Perform routine skin checks? No  Perform monthly self breast exams?  Yes  Last Mammo:   past due and secondary to lipidema declines  Last gyn exam:   past due will schedule with [address] [place] [address]    Colonoscopy:  Last colonoscopy was 1/1/2024      There is no immunization history on file for this patient.    Allergies   Allergen Reactions    Erythromycin Hives    Penicillins Hives     Current Outpatient Medications   Medication Sig Dispense Refill    Dietary Management Product (VASCULERA PO)       Probiotic Product (PROBIOTIC-10 ULTIMATE) CAPS Take 1 capsule by mouth Daily PRN      Krill Oil 1000 MG CAPS Take by mouth       No current facility-administered medications for this visit.       Past

## 2025-06-17 ENCOUNTER — RESULTS FOLLOW-UP (OUTPATIENT)
Dept: FAMILY MEDICINE CLINIC | Age: 60
End: 2025-06-17

## 2025-06-17 LAB — HCV AB SERPL QL IA: NORMAL

## 2025-06-17 NOTE — TELEPHONE ENCOUNTER
Submitted PA for Zepbound 2.5MG/0.5ML pen-injectors  Via CMM Key: [id number] STATUS: CaseId:[id number];Status:Approved;Review Type:Prior Auth;Coverage Start Date:05/18/2025;Coverage End Date:02/12/2026     Please notify patient. Thank you.

## 2025-07-14 DIAGNOSIS — E66.01 CLASS 2 SEVERE OBESITY WITH SERIOUS COMORBIDITY AND BODY MASS INDEX (BMI) OF 38.0 TO 38.9 IN ADULT, UNSPECIFIED OBESITY TYPE (HCC): ICD-10-CM

## 2025-07-14 DIAGNOSIS — E66.812 CLASS 2 SEVERE OBESITY WITH SERIOUS COMORBIDITY AND BODY MASS INDEX (BMI) OF 38.0 TO 38.9 IN ADULT, UNSPECIFIED OBESITY TYPE (HCC): ICD-10-CM

## 2025-07-14 NOTE — TELEPHONE ENCOUNTER
Pt comment: I administered my 4th dose today. I’m reluctant to move up to 5mg since i am traveling for work next week and am concerned it will cause issues. My first dose caused diarrhea, but was otherwise fine.   Would it be possible to order 2.5mg for next week but also have the 5mg script available for when I return? Or should I stay on 2.5 for the next month?  I’m down 9# in 3 weeks and inflammation has improved.     Thanks!\"  1 month of 2.5 mg pended    Tiera Hawkins is requesting refill(s) tirzepatide   Last OV 6/16/25 (pertaining to medication)  LR 6/16/25 (per medication requested)  Next office visit scheduled or attempted No   If no, reason:

## 2025-08-11 ENCOUNTER — PATIENT MESSAGE (OUTPATIENT)
Dept: FAMILY MEDICINE CLINIC | Age: 60
End: 2025-08-11

## 2025-08-11 DIAGNOSIS — E66.01 CLASS 2 SEVERE OBESITY WITH SERIOUS COMORBIDITY AND BODY MASS INDEX (BMI) OF 38.0 TO 38.9 IN ADULT, UNSPECIFIED OBESITY TYPE (HCC): Primary | ICD-10-CM

## 2025-08-11 DIAGNOSIS — E66.812 CLASS 2 SEVERE OBESITY WITH SERIOUS COMORBIDITY AND BODY MASS INDEX (BMI) OF 38.0 TO 38.9 IN ADULT, UNSPECIFIED OBESITY TYPE (HCC): Primary | ICD-10-CM
